# Patient Record
Sex: FEMALE | Race: WHITE | NOT HISPANIC OR LATINO | Employment: FULL TIME | URBAN - METROPOLITAN AREA
[De-identification: names, ages, dates, MRNs, and addresses within clinical notes are randomized per-mention and may not be internally consistent; named-entity substitution may affect disease eponyms.]

---

## 2017-01-01 ENCOUNTER — HOSPITAL ENCOUNTER (EMERGENCY)
Facility: MEDICAL CENTER | Age: 47
End: 2017-01-01
Attending: EMERGENCY MEDICINE
Payer: MEDICAID

## 2017-01-01 ENCOUNTER — APPOINTMENT (OUTPATIENT)
Dept: RADIOLOGY | Facility: MEDICAL CENTER | Age: 47
End: 2017-01-01
Attending: EMERGENCY MEDICINE
Payer: MEDICAID

## 2017-01-01 VITALS
DIASTOLIC BLOOD PRESSURE: 62 MMHG | SYSTOLIC BLOOD PRESSURE: 145 MMHG | OXYGEN SATURATION: 98 % | HEIGHT: 62 IN | BODY MASS INDEX: 29.44 KG/M2 | HEART RATE: 78 BPM | TEMPERATURE: 98 F | WEIGHT: 160 LBS | RESPIRATION RATE: 16 BRPM

## 2017-01-01 DIAGNOSIS — R10.2 PELVIC PAIN: ICD-10-CM

## 2017-01-01 LAB
ALBUMIN SERPL BCP-MCNC: 4.5 G/DL (ref 3.2–4.9)
ALBUMIN/GLOB SERPL: 1.6 G/DL
ALP SERPL-CCNC: 80 U/L (ref 30–99)
ALT SERPL-CCNC: 18 U/L (ref 2–50)
ANION GAP SERPL CALC-SCNC: 10 MMOL/L (ref 0–11.9)
AST SERPL-CCNC: 17 U/L (ref 12–45)
BASOPHILS # BLD AUTO: 0.6 % (ref 0–1.8)
BASOPHILS # BLD: 0.04 K/UL (ref 0–0.12)
BILIRUB SERPL-MCNC: 0.3 MG/DL (ref 0.1–1.5)
BUN SERPL-MCNC: 12 MG/DL (ref 8–22)
CALCIUM SERPL-MCNC: 9.7 MG/DL (ref 8.5–10.5)
CHLORIDE SERPL-SCNC: 103 MMOL/L (ref 96–112)
CO2 SERPL-SCNC: 25 MMOL/L (ref 20–33)
CREAT SERPL-MCNC: 0.63 MG/DL (ref 0.5–1.4)
EOSINOPHIL # BLD AUTO: 0.18 K/UL (ref 0–0.51)
EOSINOPHIL NFR BLD: 2.7 % (ref 0–6.9)
ERYTHROCYTE [DISTWIDTH] IN BLOOD BY AUTOMATED COUNT: 45.5 FL (ref 35.9–50)
GFR SERPL CREATININE-BSD FRML MDRD: >60 ML/MIN/1.73 M 2
GLOBULIN SER CALC-MCNC: 2.9 G/DL (ref 1.9–3.5)
GLUCOSE SERPL-MCNC: 89 MG/DL (ref 65–99)
HCT VFR BLD AUTO: 39 % (ref 37–47)
HGB BLD-MCNC: 11.9 G/DL (ref 12–16)
IMM GRANULOCYTES # BLD AUTO: 0.02 K/UL (ref 0–0.11)
IMM GRANULOCYTES NFR BLD AUTO: 0.3 % (ref 0–0.9)
INR PPP: 0.81 (ref 0.87–1.13)
LYMPHOCYTES # BLD AUTO: 1.96 K/UL (ref 1–4.8)
LYMPHOCYTES NFR BLD: 29.5 % (ref 22–41)
MCH RBC QN AUTO: 23.7 PG (ref 27–33)
MCHC RBC AUTO-ENTMCNC: 30.5 G/DL (ref 33.6–35)
MCV RBC AUTO: 77.7 FL (ref 81.4–97.8)
MONOCYTES # BLD AUTO: 0.49 K/UL (ref 0–0.85)
MONOCYTES NFR BLD AUTO: 7.4 % (ref 0–13.4)
NEUTROPHILS # BLD AUTO: 3.96 K/UL (ref 2–7.15)
NEUTROPHILS NFR BLD: 59.5 % (ref 44–72)
NRBC # BLD AUTO: 0 K/UL
NRBC BLD AUTO-RTO: 0 /100 WBC
PLATELET # BLD AUTO: 366 K/UL (ref 164–446)
PMV BLD AUTO: 8.9 FL (ref 9–12.9)
POTASSIUM SERPL-SCNC: 3.8 MMOL/L (ref 3.6–5.5)
PROT SERPL-MCNC: 7.4 G/DL (ref 6–8.2)
PROTHROMBIN TIME: 11.4 SEC (ref 12–14.6)
RBC # BLD AUTO: 5.02 M/UL (ref 4.2–5.4)
SODIUM SERPL-SCNC: 138 MMOL/L (ref 135–145)
WBC # BLD AUTO: 6.7 K/UL (ref 4.8–10.8)

## 2017-01-01 PROCEDURE — 96375 TX/PRO/DX INJ NEW DRUG ADDON: CPT

## 2017-01-01 PROCEDURE — 99285 EMERGENCY DEPT VISIT HI MDM: CPT

## 2017-01-01 PROCEDURE — 80053 COMPREHEN METABOLIC PANEL: CPT

## 2017-01-01 PROCEDURE — 96374 THER/PROPH/DIAG INJ IV PUSH: CPT

## 2017-01-01 PROCEDURE — 96376 TX/PRO/DX INJ SAME DRUG ADON: CPT

## 2017-01-01 PROCEDURE — 700105 HCHG RX REV CODE 258: Performed by: EMERGENCY MEDICINE

## 2017-01-01 PROCEDURE — 96361 HYDRATE IV INFUSION ADD-ON: CPT

## 2017-01-01 PROCEDURE — 700111 HCHG RX REV CODE 636 W/ 250 OVERRIDE (IP): Performed by: EMERGENCY MEDICINE

## 2017-01-01 PROCEDURE — 85025 COMPLETE CBC W/AUTO DIFF WBC: CPT

## 2017-01-01 PROCEDURE — 85610 PROTHROMBIN TIME: CPT

## 2017-01-01 PROCEDURE — 76830 TRANSVAGINAL US NON-OB: CPT

## 2017-01-01 RX ORDER — ONDANSETRON 2 MG/ML
4 INJECTION INTRAMUSCULAR; INTRAVENOUS ONCE
Status: COMPLETED | OUTPATIENT
Start: 2017-01-01 | End: 2017-01-01

## 2017-01-01 RX ORDER — OXYCODONE HYDROCHLORIDE AND ACETAMINOPHEN 5; 325 MG/1; MG/1
15 TABLET ORAL EVERY 4 HOURS PRN
Qty: 1 TAB | Refills: 0 | Status: ON HOLD | OUTPATIENT
Start: 2017-01-01 | End: 2017-01-03

## 2017-01-01 RX ORDER — ONDANSETRON 4 MG/1
4 TABLET, FILM COATED ORAL EVERY 4 HOURS PRN
Qty: 15 EACH | Refills: 0 | Status: ON HOLD | OUTPATIENT
Start: 2017-01-01 | End: 2017-01-03

## 2017-01-01 RX ORDER — CYCLOBENZAPRINE HCL 10 MG
10 TABLET ORAL 3 TIMES DAILY PRN
Qty: 10 TAB | Refills: 0 | Status: SHIPPED | OUTPATIENT
Start: 2017-01-01 | End: 2017-01-06

## 2017-01-01 RX ORDER — OXYCODONE HYDROCHLORIDE AND ACETAMINOPHEN 5; 325 MG/1; MG/1
1 TABLET ORAL EVERY 4 HOURS PRN
Qty: 15 TAB | Refills: 0 | Status: ON HOLD | OUTPATIENT
Start: 2017-01-01 | End: 2017-01-03

## 2017-01-01 RX ORDER — SODIUM CHLORIDE 9 MG/ML
INJECTION, SOLUTION INTRAVENOUS CONTINUOUS
Status: DISCONTINUED | OUTPATIENT
Start: 2017-01-01 | End: 2017-01-01 | Stop reason: HOSPADM

## 2017-01-01 RX ADMIN — HYDROMORPHONE HYDROCHLORIDE 1 MG: 1 INJECTION, SOLUTION INTRAMUSCULAR; INTRAVENOUS; SUBCUTANEOUS at 13:31

## 2017-01-01 RX ADMIN — SODIUM CHLORIDE: 9 INJECTION, SOLUTION INTRAVENOUS at 13:27

## 2017-01-01 RX ADMIN — HYDROMORPHONE HYDROCHLORIDE 1 MG: 1 INJECTION, SOLUTION INTRAMUSCULAR; INTRAVENOUS; SUBCUTANEOUS at 14:56

## 2017-01-01 RX ADMIN — ONDANSETRON 4 MG: 2 INJECTION, SOLUTION INTRAMUSCULAR; INTRAVENOUS at 14:56

## 2017-01-01 RX ADMIN — ONDANSETRON 4 MG: 2 INJECTION, SOLUTION INTRAMUSCULAR; INTRAVENOUS at 13:30

## 2017-01-01 ASSESSMENT — PAIN SCALES - GENERAL
PAINLEVEL_OUTOF10: 7
PAINLEVEL_OUTOF10: 7
PAINLEVEL_OUTOF10: 0
PAINLEVEL_OUTOF10: 7

## 2017-01-01 ASSESSMENT — LIFESTYLE VARIABLES
EVER FELT BAD OR GUILTY ABOUT YOUR DRINKING: NO
HAVE YOU EVER FELT YOU SHOULD CUT DOWN ON YOUR DRINKING: NO
CONSUMPTION TOTAL: NEGATIVE
TOTAL SCORE: 0
DO YOU DRINK ALCOHOL: NO
ON A TYPICAL DAY WHEN YOU DRINK ALCOHOL HOW MANY DRINKS DO YOU HAVE: 1
HAVE PEOPLE ANNOYED YOU BY CRITICIZING YOUR DRINKING: NO
AVERAGE NUMBER OF DAYS PER WEEK YOU HAVE A DRINK CONTAINING ALCOHOL: .5
TOTAL SCORE: 0
EVER HAD A DRINK FIRST THING IN THE MORNING TO STEADY YOUR NERVES TO GET RID OF A HANGOVER: NO
HOW MANY TIMES IN THE PAST YEAR HAVE YOU HAD 5 OR MORE DRINKS IN A DAY: 0
TOTAL SCORE: 0
DO YOU DRINK ALCOHOL: YES

## 2017-01-01 NOTE — ED NOTES
BREAK RN NOTE**  PIV placed to R ac, blood drawn and sent to the lab.  Pt medicated for pain/nausea per MAR.  NS infusing.  Call light in hand.  Pt on all monitoring equipment.

## 2017-01-01 NOTE — ED PROVIDER NOTES
ED Provider Note  CHIEF COMPLAINT  Chief Complaint   Patient presents with   • Vaginal Bleeding       HPI  Trevon Brennan is a 46 y.o. female who presents some mild vaginal bleeding and some pelvic pain. She's had the intermittent vaginal bleeding and pelvic pain for several months. She is scheduled for a hysterectomy this coming Tuesday by Dr. Sears. She's had recent lab work done. She had a pregnancy test which was -2 days ago. Came in because of the pain mainly.    REVIEW OF SYSTEMS  No headache, no chest pain, no abdominal pain. Just occasional abdominal cramps.  ALL OTHER SYSTEMS NEGATIVE    ALLERGIES  Allergies   Allergen Reactions   • Erythromycin Hives     Hives for a week       CURRENT MEDICATIONS  Home Medications     **Home medications have not yet been reviewed for this encounter**          PAST MEDICAL HISTORY  Past Medical History   Diagnosis Date   • migraines    • Urinary incontinence    • Heart burn    • Indigestion    • Bowel habit changes    • Depression 2016     anxiety and depression       SURGICAL HISTORY  Past Surgical History   Procedure Laterality Date   • Other abdominal surgery       choley   • Other abdominal surgery       appy   • Gastroscopy-endo  12/21/2011     Performed by DELFINO MICHELE at ENDOSCOPY [facility]   • Ercp in or  11/18/2012     Performed by Junior Davenport M.D. at SURGERY [facility]       FAMILY HISTORY  No family history on file.    SOCIAL HISTORY  Social History     Social History   • Marital Status: Single     Spouse Name: N/A   • Number of Children: N/A   • Years of Education: N/A     Social History Main Topics   • Smoking status: Never Smoker    • Smokeless tobacco: Never Used   • Alcohol Use: No   • Drug Use: No   • Sexual Activity: Not on file     Other Topics Concern   • Not on file     Social History Narrative       PHYSICAL EXAM  GENERAL: Alert healthy-appearing female moderately obese  VITAL SIGNS: /74 mmHg  Pulse 72  Temp(Src)  "36.2 °C (97.2 °F) (Temporal)  Resp 16  Ht 1.575 m (5' 2.01\")  Wt 72.576 kg (160 lb)  BMI 29.26 kg/m2  SpO2 97%  LMP  (Within Weeks)  Constitutional: Alert healthy-appearing adult   HENT: Scalp is normal size and nontender. Ears are clear. Nose is clear. Throat is clear with no stridor no drooling no trismus. Teeth are all intact.  Eyes: Pupils equal round and reactive to light, extraocular motor fall. There is no scleral icterus.  Neck: Neck is supple and nontender. There is no meningismus. No adenitis. No thyromegaly.  Lymphatic: No adenopathy.   Cardiovascular: Heart regular rhythm without murmurs or gallops   Thorax & Lungs: No chest wall tenderness. Lungs are clear. Patient has good breath sounds bilateral. No rales, no rhonchi, no wheezes.  Abdomen: Abdomen is soft, nontender, not rigid, no guarding, and no organomegaly. There is no palpable hernia   Skin: Warm, pink, and dry with no erythema and no rash.   Back: Nontender, no midline bony tenderness, no flank tenderness.  Examination: The uterus is nontender. Slightly enlarged. Cervical os is closed. Some blood in the vagina. No tenderness. No salpingitis.  Extremities: Full range of motion  No tenderness to palpation and no deformities noted. No calf or thigh swelling. No calf or thigh tenderness. No clinical DVT.  Neurologic: Alert & oriented . Cranial nerves are grossly intact as tested. Patient moves all 4 extremities well. Patient has good strong flexion and extension of the ankle joints knee joints hip joints and elbow joints. Sensation is normal and symmetrical in the upper and lower extremities.   Psychiatric: Patient is alert oriented coherent and rational.       RADIOLOGY/PROCEDURES  US-GYN-PELVIS TRANSVAGINAL   Final Result      1.  There are 2 uterine body myometrial fibroids.            COURSE & MEDICAL DECISION MAKING  The patient is scheduled for a hysterectomy for vaginal bleeding and pelvic pain in 2 days. She had a negative pregnancy " test 2 or 3 days ago. Her hemoglobin and hematocrit of and normal. She comes in because of pain. Her home pain medication is Ultram. She is also on Effexor and Seroquel and Klonopin.    Plan: #1 IV #2 intravenous Zofran and Dilaudid No. 3. Pelvic ultrasound #4. Lab including CBC, CMP, pro time. #5. Review of previous medical records    Review of previous medical records: She is scheduled for a hysterectomy on Tuesday of this week by Dr. roula Sears. Her medications at home include oxycodone, Protonix, Klonopin, Effexor, Seroquel, Norco, Lioresal.    Laboratory and reexamination: Ultrasound shows uterine fibroids. M history panel was normal. INR is normal at 0.81. Hemoglobin is 11.9. No anemia. No bandemia.  Results for orders placed or performed during the hospital encounter of 01/01/17   CBC WITH DIFFERENTIAL   Result Value Ref Range    WBC 6.7 4.8 - 10.8 K/uL    RBC 5.02 4.20 - 5.40 M/uL    Hemoglobin 11.9 (L) 12.0 - 16.0 g/dL    Hematocrit 39.0 37.0 - 47.0 %    MCV 77.7 (L) 81.4 - 97.8 fL    MCH 23.7 (L) 27.0 - 33.0 pg    MCHC 30.5 (L) 33.6 - 35.0 g/dL    RDW 45.5 35.9 - 50.0 fL    Platelet Count 366 164 - 446 K/uL    MPV 8.9 (L) 9.0 - 12.9 fL    Neutrophils-Polys 59.50 44.00 - 72.00 %    Lymphocytes 29.50 22.00 - 41.00 %    Monocytes 7.40 0.00 - 13.40 %    Eosinophils 2.70 0.00 - 6.90 %    Basophils 0.60 0.00 - 1.80 %    Immature Granulocytes 0.30 0.00 - 0.90 %    Nucleated RBC 0.00 /100 WBC    Neutrophils (Absolute) 3.96 2.00 - 7.15 K/uL    Lymphs (Absolute) 1.96 1.00 - 4.80 K/uL    Monos (Absolute) 0.49 0.00 - 0.85 K/uL    Eos (Absolute) 0.18 0.00 - 0.51 K/uL    Baso (Absolute) 0.04 0.00 - 0.12 K/uL    Immature Granulocytes (abs) 0.02 0.00 - 0.11 K/uL    NRBC (Absolute) 0.00 K/uL   COMP METABOLIC PANEL   Result Value Ref Range    Sodium 138 135 - 145 mmol/L    Potassium 3.8 3.6 - 5.5 mmol/L    Chloride 103 96 - 112 mmol/L    Co2 25 20 - 33 mmol/L    Anion Gap 10.0 0.0 - 11.9    Glucose 89 65 - 99 mg/dL    Bun  12 8 - 22 mg/dL    Creatinine 0.63 0.50 - 1.40 mg/dL    Calcium 9.7 8.5 - 10.5 mg/dL    AST(SGOT) 17 12 - 45 U/L    ALT(SGPT) 18 2 - 50 U/L    Alkaline Phosphatase 80 30 - 99 U/L    Total Bilirubin 0.3 0.1 - 1.5 mg/dL    Albumin 4.5 3.2 - 4.9 g/dL    Total Protein 7.4 6.0 - 8.2 g/dL    Globulin 2.9 1.9 - 3.5 g/dL    A-G Ratio 1.6 g/dL   PROTHROMBIN TIME (INR)   Result Value Ref Range    PT 11.4 (L) 12.0 - 14.6 sec    INR 0.81 (L) 0.87 - 1.13   ESTIMATED GFR   Result Value Ref Range    GFR If African American >60 >60 mL/min/1.73 m 2    GFR If Non African American >60 >60 mL/min/1.73 m 2        The patient stable for discharge at 3:45 PM.    Home treatment: #1 Percocet and Flexeril No. 2 follow-up with her gynecologist tomorrow. #3 she'll have her hysterectomy on Tuesday. Return as needed.  FINAL IMPRESSION  1. Pelvic pain and vaginal bleeding acute and chronic  2.  Uterine fibroids and dysfunctional uterine bleeding         Electronically signed by: Gary Gansert, 1/1/2017 4 PM

## 2017-01-01 NOTE — ED NOTES
Pt reports vaginal bleeding and pelvic pain. Pt has hysterectomy schedulded on Tuesday. Pt bib EMS for worsening symptoms. Pt in NAD. VSS

## 2017-01-01 NOTE — ED AVS SNAPSHOT
SirionLabs Access Code: [email or web address]  Expires: 1/31/2017  4:18 PM    Your email address is not on file at [facility].  Email Addresses are required for you to sign up for SirionLabs, please contact [phone] to verify your personal information and to provide your email address prior to attempting to register for SirionLabs.    Trevon Brennan  [address] [place] [address]    SirionLabs  A secure, online tool to manage your health information     [employer]’s SirionLabs® is a secure, online tool that connects you to your personalized health information from the privacy of your home -- day or night - making it very easy for you to manage your healthcare. Once the activation process is completed, you can even access your medical information using the SirionLabs kirill, which is available for free in the Apple Kirill store or Google Play store.     To learn more about SirionLabs, visit [email or web address]    There are two levels of access available (as shown below):   My Chart Features  [facility] Primary Care Doctor [facility]  Specialists [facility]  Urgent  Care Non-[facility] Primary Care Doctor   Email your healthcare team securely and privately 24/7 X X X    Manage appointments: schedule your next appointment; view details of past/upcoming appointments X      Request prescription refills. X      View recent personal medical records, including lab and immunizations X X X X   View health record, including health history, allergies, medications X X X X   Read reports about your outpatient visits, procedures, consult and ER notes X X X X   See your discharge summary, which is a recap of your hospital and/or ER visit that includes your diagnosis, lab results, and care plan X X  X     How to register for ClearAppt:  Once your e-mail address has been verified, follow the following steps to sign up for ClearAppt.     1. Go to  [email or web address]  2. Click on the Sign Up Now box, which takes you to the New Member Sign Up page. You  will need to provide the following information:  a. Enter your VTEX Access Code exactly as it appears at the top of this page. (You will not need to use this code after you’ve completed the sign-up process. If you do not sign up before the expiration date, you must request a new code.)   b. Enter your date of birth.   c. Enter your home email address.   d. Click Submit, and follow the next screen’s instructions.  3. Create a Innovational Fundingt ID. This will be your VTEX login ID and cannot be changed, so think of one that is secure and easy to remember.  4. Create a VTEX password. You can change your password at any time.  5. Enter your Password Reset Question and Answer. This can be used at a later time if you forget your password.   6. Enter your e-mail address. This allows you to receive e-mail notifications when new information is available in VTEX.  7. Click Sign Up. You can now view your health information.    For assistance activating your VTEX account, call [phone]

## 2017-01-01 NOTE — ED AVS SNAPSHOT
After Visit Summary                                                                                                                Trevon Brennan   MRN: [id number]    Department:  [facility], Emergency Dept   Date of Visit:  1/1/2017            [facility], Emergency Dept    [address]    [place] [address]    Phone:  [phone]      You were seen by     Gary Gansert, M.D.      Your Diagnosis Was     Pelvic pain     R10.2       These are the medications you received during your hospitalization from 01/01/2017 1235 to 01/01/2017 1618     Date/Time Order Dose Route Action    01/01/2017 1327 NS infusion   Intravenous New Bag    01/01/2017 1331 HYDROmorphone (DILAUDID) injection 1 mg 1 mg Intravenous Given    01/01/2017 1330 ondansetron (ZOFRAN) syringe/vial injection 4 mg 4 mg Intravenous Given    01/01/2017 1456 HYDROmorphone (DILAUDID) injection 1 mg 1 mg Intravenous Given    01/01/2017 1456 ondansetron (ZOFRAN) syringe/vial injection 4 mg 4 mg Intravenous Given      Medication Information     Review all of your home medications and newly ordered medications with your primary doctor and/or pharmacist as soon as possible. Follow medication instructions as directed by your doctor and/or pharmacist.     Please keep your complete medication list with you and share with your physician. Update the information when medications are discontinued, doses are changed, or new medications (including over-the-counter products) are added; and carry medication information at all times in the event of emergency situations.               Medication List      START taking these medications        Instructions    cyclobenzaprine 10 MG Tabs   Commonly known as:  FLEXERIL    Take 1 Tab by mouth 3 times a day as needed.   Dose:  10 mg       ondansetron 4 MG Tabs tablet   Commonly known as:  ZOFRAN    Take 1 Tab by mouth every four hours as needed for Nausea/Vomiting.   Dose:  4 mg       oxycodone-acetaminophen 5-325 MG Tabs   Commonly known as:  PERCOCET    Take 15 Tabs by mouth every four hours as needed.   Dose:  15 Tab         ASK your doctor about these medications        Instructions    baclofen 10 MG Tabs   Commonly known as:  LIORESAL    Take 10 mg by mouth every day.   Dose:  10 mg       clonazepam 0.5 MG Tabs   Commonly known as:  KLONOPIN    Take 0.25 mg by mouth every day. Pt took approx 8 at 1130 and then 20 pills at 1200.   Dose:  0.25 mg       EFFEXOR 75 MG Tabs   Generic drug:  venlafaxine    Take 75 mg by mouth 3 times a day.   Dose:  75 mg       famotidine 20 MG Tabs   Commonly known as:  PEPCID    Take 20 mg by mouth as needed.   Dose:  20 mg       ferrous gluconate 324 (38 FE) MG Tabs   Commonly known as:  FERGON    Take 324 mg by mouth 2 times a day, with meals.   Dose:  324 mg       hydrocodone-acetaminophen 5-325 MG Tabs per tablet   Commonly known as:  NORCO    Take 1-2 Tabs by mouth every four hours as needed.   Dose:  1-2 Tab       ondansetron 4 MG Tbdp   Commonly known as:  ZOFRAN ODT    Take 1 Tab by mouth 4 times a day as needed for Nausea/Vomiting.   Dose:  4 mg       oxycodone immediate release 10 MG immediate release tablet   Commonly known as:  ROXICODONE    Take 1 Tab by mouth every four hours as needed ((4-6)).   Dose:  10 mg       pantoprazole 40 MG Tbec   Commonly known as:  PROTONIX    Take 40 mg by mouth every day.   Dose:  40 mg       PAXIL 40 MG tablet   Generic drug:  paroxetine    Take 40 mg by mouth every day.   Dose:  40 mg       SEROQUEL 100 MG Tabs   Generic drug:  quetiapine    Take 100 mg by mouth 2 times a day.   Dose:  100 mg       tramadol 50 MG Tabs   Commonly known as:  ULTRAM    Take 50 mg by mouth every 6 hours as needed.   Dose:  50 mg       TYLENOL COLD PO    Take  by mouth as needed.               Procedures and tests performed during your visit     CBC WITH DIFFERENTIAL    COMP METABOLIC PANEL    ESTIMATED GFR    IV Saline Lock     PROTHROMBIN TIME (INR)    US-GYN-PELVIS TRANSVAGINAL            Patient Information     Patient Information    Following emergency treatment: all patient requiring follow-up care must return either to a private physician or a clinic if your condition worsens before you are able to obtain further medical attention, please return to the emergency room.     Billing Information    At [facility], we work to make the billing process streamlined for our patients.  Our Representatives are here to answer any questions you may have regarding your hospital bill.  If you have insurance coverage and have supplied your insurance information to us, we will submit a claim to your insurer on your behalf.  Should you have any questions regarding your bill, we can be reached online or by phone as follows:  Online: You are able pay your bills online or live chat with our representatives about any billing questions you may have. We are here to help Monday - Friday from 8:00am to 7:30pm and 9:00am - 12:00pm on Saturdays.  Please visit [email or web address]  for more information.   Phone:  [phone] or [phone]    Please note that your emergency physician, surgeon, pathologist, radiologist, anesthesiologist, and other specialists are not employed by [facility] and will therefore bill separately for their services.  Please contact them directly for any questions concerning their bills at the numbers below:     Emergency Physician Services:  [phone]  [facility]:  [phone]  [facility]:  [phone]  [facility]:  [phone]    1. Your final bill may vary from the amount quoted upon discharge if all procedures are not complete at that time, or if your doctor has additional procedures of which we are not aware. You will receive an additional bill if you return to the Emergency Department at [facility] for suture removal regardless of the  facility of which the sutures were placed.     2. Please arrange for settlement of this account at the emergency registration.    3. All self-pay accounts are due in full at the time of treatment.  If you are unable to meet this obligation then payment is expected within 4-5 days.     4. If you have had radiology studies (CT, X-ray, Ultrasound, MRI), you have received a preliminary result during your emergency department visit. Please contact the radiology department [phone] to receive a copy of your final result. Please discuss the Final result with your primary physician or with the follow up physician provided.     Crisis Hotline:  [place] Crisis Hotline:  3-623-PBWKCUY or 1-320.166.5442  [place] Crisis Hotline:    1-572.455.4451 or 429-596-2770         ED Discharge Follow Up Questions    1. In order to provide you with very good care, we would like to follow up with a phone call in the next few days.  May we have your permission to contact you?     YES /  NO    2. What is the best phone number to call you? (       )_____-__________    3. What is the best time to call you?      Morning  /  Afternoon  /  Evening                   Patient Signature:  ____________________________________________________________    Date:  ____________________________________________________________

## 2017-01-01 NOTE — ED AVS SNAPSHOT
1/1/2017          Trevon Brennan  [address] [place] [address]    Dear Trevon:    [facility] wants to ensure your discharge home is safe and you or your loved ones have had all your questions answered regarding your care after you leave the hospital.    You may receive a telephone call within two days of your discharge.  This call is to make certain you understand your discharge instructions as well as ensure we provided you with the best care possible during your stay with us.     The call will only last approximately 3-5 minutes and will be done by a nurse.    Once again, we want to ensure your discharge home is safe and that you have a clear understanding of any next steps in your care.  If you have any questions or concerns, please do not hesitate to contact us, we are here for you.  Thank you for choosing [facility] for your healthcare needs.    Sincerely,    Phil Orozco    [facility]

## 2017-01-02 NOTE — ED NOTES
Pt given discharge instructions and prescriptions for percocet, zofran and flexeril, understanding verbalized. Pt VSS resp even and unlabored. Pt ambulatory out of ER, NAD noted. Pt states she will receive ride home from friend.

## 2017-01-03 PROBLEM — N94.6 DYSMENORRHEA: Status: ACTIVE | Noted: 2017-01-03

## 2017-01-03 PROBLEM — D25.0 SUBMUCOUS LEIOMYOMA OF UTERUS: Status: ACTIVE | Noted: 2017-01-03

## 2017-01-03 PROBLEM — N92.6 IRREGULAR MENSTRUAL BLEEDING: Status: ACTIVE | Noted: 2017-01-03

## 2017-01-03 PROBLEM — R10.2 VAGINAL PAIN: Status: ACTIVE | Noted: 2017-01-03

## 2017-01-03 PROBLEM — N39.3 FEMALE STRESS INCONTINENCE: Status: ACTIVE | Noted: 2017-01-03

## 2017-01-06 ENCOUNTER — HOSPITAL ENCOUNTER (EMERGENCY)
Facility: MEDICAL CENTER | Age: 47
End: 2017-01-06
Payer: MEDICAID

## 2017-01-06 ENCOUNTER — HOSPITAL ENCOUNTER (EMERGENCY)
Facility: MEDICAL CENTER | Age: 47
End: 2017-01-07
Attending: EMERGENCY MEDICINE
Payer: MEDICAID

## 2017-01-06 ENCOUNTER — APPOINTMENT (OUTPATIENT)
Dept: RADIOLOGY | Facility: MEDICAL CENTER | Age: 47
End: 2017-01-06
Attending: EMERGENCY MEDICINE
Payer: MEDICAID

## 2017-01-06 VITALS
TEMPERATURE: 97.8 F | WEIGHT: 161.82 LBS | RESPIRATION RATE: 18 BRPM | DIASTOLIC BLOOD PRESSURE: 64 MMHG | SYSTOLIC BLOOD PRESSURE: 130 MMHG | HEART RATE: 71 BPM | OXYGEN SATURATION: 93 % | BODY MASS INDEX: 29.59 KG/M2

## 2017-01-06 VITALS
HEART RATE: 71 BPM | OXYGEN SATURATION: 93 % | DIASTOLIC BLOOD PRESSURE: 64 MMHG | RESPIRATION RATE: 18 BRPM | BODY MASS INDEX: 29.59 KG/M2 | TEMPERATURE: 97.8 F | WEIGHT: 161.82 LBS | SYSTOLIC BLOOD PRESSURE: 130 MMHG

## 2017-01-06 DIAGNOSIS — R10.30 LOWER ABDOMINAL PAIN: ICD-10-CM

## 2017-01-06 PROCEDURE — 700105 HCHG RX REV CODE 258: Performed by: EMERGENCY MEDICINE

## 2017-01-06 PROCEDURE — 99284 EMERGENCY DEPT VISIT MOD MDM: CPT

## 2017-01-06 PROCEDURE — 302449 STATCHG TRIAGE ONLY (STATISTIC)

## 2017-01-06 PROCEDURE — 83690 ASSAY OF LIPASE: CPT

## 2017-01-06 PROCEDURE — 96361 HYDRATE IV INFUSION ADD-ON: CPT

## 2017-01-06 PROCEDURE — 700111 HCHG RX REV CODE 636 W/ 250 OVERRIDE (IP): Performed by: EMERGENCY MEDICINE

## 2017-01-06 PROCEDURE — 80053 COMPREHEN METABOLIC PANEL: CPT

## 2017-01-06 PROCEDURE — 96374 THER/PROPH/DIAG INJ IV PUSH: CPT

## 2017-01-06 PROCEDURE — 85025 COMPLETE CBC W/AUTO DIFF WBC: CPT

## 2017-01-06 PROCEDURE — 96375 TX/PRO/DX INJ NEW DRUG ADDON: CPT

## 2017-01-06 RX ORDER — ONDANSETRON 2 MG/ML
4 INJECTION INTRAMUSCULAR; INTRAVENOUS ONCE
Status: COMPLETED | OUTPATIENT
Start: 2017-01-06 | End: 2017-01-06

## 2017-01-06 RX ORDER — SODIUM CHLORIDE 9 MG/ML
INJECTION, SOLUTION INTRAVENOUS CONTINUOUS
Status: DISCONTINUED | OUTPATIENT
Start: 2017-01-06 | End: 2017-01-07 | Stop reason: HOSPADM

## 2017-01-06 RX ADMIN — SODIUM CHLORIDE: 9 INJECTION, SOLUTION INTRAVENOUS at 23:37

## 2017-01-06 RX ADMIN — FENTANYL CITRATE 100 MCG: 50 INJECTION, SOLUTION INTRAMUSCULAR; INTRAVENOUS at 23:36

## 2017-01-06 RX ADMIN — ONDANSETRON 4 MG: 2 INJECTION, SOLUTION INTRAMUSCULAR; INTRAVENOUS at 23:36

## 2017-01-06 NOTE — ED AVS SNAPSHOT
Moneysoft Access Code: [email or web address]  Expires: 1/31/2017  4:18 PM    Your email address is not on file at [facility].  Email Addresses are required for you to sign up for Moneysoft, please contact [phone] to verify your personal information and to provide your email address prior to attempting to register for Moneysoft.    Trevon Brennan  [address] [place] [address]    Moneysoft  A secure, online tool to manage your health information     [facility]’s Moneysoft® is a secure, online tool that connects you to your personalized health information from the privacy of your home -- day or night - making it very easy for you to manage your healthcare. Once the activation process is completed, you can even access your medical information using the Moneysoft kirill, which is available for free in the Apple Kirill store or Google Play store.     To learn more about Moneysoft, visit [email or web address]    There are two levels of access available (as shown below):   My Chart Features  [facility] Primary Care Doctor [facility]  Specialists [facility]  Urgent  Care Non-[facility] Primary Care Doctor   Email your healthcare team securely and privately 24/7 X X X    Manage appointments: schedule your next appointment; view details of past/upcoming appointments X      Request prescription refills. X      View recent personal medical records, including lab and immunizations X X X X   View health record, including health history, allergies, medications X X X X   Read reports about your outpatient visits, procedures, consult and ER notes X X X X   See your discharge summary, which is a recap of your hospital and/or ER visit that includes your diagnosis, lab results, and care plan X X  X     How to register for DoseMet:  Once your e-mail address has been verified, follow the following steps to sign up for DoseMet.     1. Go to  [email or web address]  2. Click on the Sign Up Now box, which takes you to the New Member Sign Up page. You  will need to provide the following information:  a. Enter your WealthForge Access Code exactly as it appears at the top of this page. (You will not need to use this code after you’ve completed the sign-up process. If you do not sign up before the expiration date, you must request a new code.)   b. Enter your date of birth.   c. Enter your home email address.   d. Click Submit, and follow the next screen’s instructions.  3. Create a [employer]t ID. This will be your WealthForge login ID and cannot be changed, so think of one that is secure and easy to remember.  4. Create a WealthForge password. You can change your password at any time.  5. Enter your Password Reset Question and Answer. This can be used at a later time if you forget your password.   6. Enter your e-mail address. This allows you to receive e-mail notifications when new information is available in WealthForge.  7. Click Sign Up. You can now view your health information.    For assistance activating your WealthForge account, call [phone]

## 2017-01-06 NOTE — ED AVS SNAPSHOT
After Visit Summary                                                                                                                Trevon Brennan   MRN: [id number]    Department:  [facility], Emergency Dept   Date of Visit:  1/6/2017            [facility], Emergency Dept    [address] [place] [id number]    Phone:  [phone]      You were seen by     Viktor Wise M.D.      Your Diagnosis Was     Lower abdominal pain     R10.30       These are the medications you received during your hospitalization from 01/06/2017 2204 to 01/07/2017 0208     Date/Time Order Dose Route Action    01/06/2017 2337 NS infusion   Intravenous New Bag    01/06/2017 2336 fentaNYL (SUBLIMAZE) injection 100 mcg 100 mcg Intravenous Given    01/06/2017 2336 ondansetron (ZOFRAN) syringe/vial injection 4 mg 4 mg Intravenous Given    01/07/2017 0102 fentaNYL (SUBLIMAZE) injection 100 mcg 100 mcg Intravenous Given    01/07/2017 0034 iohexol (OMNIPAQUE) 350 mg/mL 100 mL Intravenous Given      Follow-up Information     1. Follow up with [facility], Emergency Dept.    Specialty:  Emergency Medicine    Why:  If symptoms worsen    Contact information    [address] [place] [id number] [phone]      Medication Information     Review all of your home medications and newly ordered medications with your primary doctor and/or pharmacist as soon as possible. Follow medication instructions as directed by your doctor and/or pharmacist.     Please keep your complete medication list with you and share with your physician. Update the information when medications are discontinued, doses are changed, or new medications (including over-the-counter products) are added; and carry medication information at all times in the event of emergency situations.               Medication List      ASK your doctor about these medications        Instructions    baclofen 10 MG Tabs   Commonly  known as:  LIORESAL    Take 10 mg by mouth every day.   Dose:  10 mg       famotidine 20 MG Tabs   Commonly known as:  PEPCID    Take 20 mg by mouth as needed.   Dose:  20 mg       ferrous gluconate 324 (38 FE) MG Tabs   Commonly known as:  FERGON    Take 324 mg by mouth 2 times a day, with meals.   Dose:  324 mg       hydrocodone-acetaminophen 5-325 MG Tabs per tablet   Commonly known as:  NORCO    Take 1-2 Tabs by mouth every four hours as needed.   Dose:  1-2 Tab       ondansetron 4 MG Tbdp   Commonly known as:  ZOFRAN ODT    Take 1 Tab by mouth 4 times a day as needed for Nausea/Vomiting.   Dose:  4 mg       oxycodone immediate release 10 MG immediate release tablet   Commonly known as:  ROXICODONE    Take 1 Tab by mouth every four hours as needed ((4-6)).   Dose:  10 mg       PAXIL 40 MG tablet   Generic drug:  paroxetine    Take 40 mg by mouth every day.   Dose:  40 mg       tramadol 50 MG Tabs   Commonly known as:  ULTRAM    Take 50 mg by mouth every 6 hours as needed.   Dose:  50 mg               Procedures and tests performed during your visit     CBC WITH DIFFERENTIAL    COMP METABOLIC PANEL    CT-ABDOMEN-PELVIS WITH    ESTIMATED GFR    IV Saline Lock    LIPASE        Discharge Instructions       Abdominal Pain, Women  Abdominal (stomach, pelvic, or belly) pain can be caused by many things. It is important to tell your doctor:  · The location of the pain.  · Does it come and go or is it present all the time?  · Are there things that start the pain (eating certain foods, exercise)?  · Are there other symptoms associated with the pain (fever, nausea, vomiting, diarrhea)?  All of this is helpful to know when trying to find the cause of the pain.  CAUSES   · Stomach: virus or bacteria infection, or ulcer.  · Intestine: appendicitis (inflamed appendix), regional ileitis (Crohn's disease), ulcerative colitis (inflamed colon), irritable bowel syndrome, diverticulitis (inflamed diverticulum of the colon), or  cancer of the stomach or intestine.  · Gallbladder disease or stones in the gallbladder.  · Kidney disease, kidney stones, or infection.  · Pancreas infection or cancer.  · Fibromyalgia (pain disorder).  · Diseases of the female organs:  · Uterus: fibroid (non-cancerous) tumors or infection.  · Fallopian tubes: infection or tubal pregnancy.  · Ovary: cysts or tumors.  · Pelvic adhesions (scar tissue).  · Endometriosis (uterus lining tissue growing in the pelvis and on the pelvic organs).  · Pelvic congestion syndrome (female organs filling up with blood just before the menstrual period).  · Pain with the menstrual period.  · Pain with ovulation (producing an egg).  · Pain with an IUD (intrauterine device, birth control) in the uterus.  · Cancer of the female organs.  · Functional pain (pain not caused by a disease, may improve without treatment).  · Psychological pain.  · Depression.  DIAGNOSIS   Your doctor will decide the seriousness of your pain by doing an examination.  · Blood tests.  · X-rays.  · Ultrasound.  · CT scan (computed tomography, special type of X-ray).  · MRI (magnetic resonance imaging).  · Cultures, for infection.  · Barium enema (dye inserted in the large intestine, to better view it with X-rays).  · Colonoscopy (looking in intestine with a lighted tube).  · Laparoscopy (minor surgery, looking in abdomen with a lighted tube).  · Major abdominal exploratory surgery (looking in abdomen with a large incision).  TREATMENT   The treatment will depend on the cause of the pain.   · Many cases can be observed and treated at home.  · Over-the-counter medicines recommended by your caregiver.  · Prescription medicine.  · Antibiotics, for infection.  · Birth control pills, for painful periods or for ovulation pain.  · Hormone treatment, for endometriosis.  · Nerve blocking injections.  · Physical therapy.  · Antidepressants.  · Counseling with a psychologist or psychiatrist.  · Minor or major surgery.  HOME  CARE INSTRUCTIONS   · Do not take laxatives, unless directed by your caregiver.  · Take over-the-counter pain medicine only if ordered by your caregiver. Do not take aspirin because it can cause an upset stomach or bleeding.  · Try a clear liquid diet (broth or water) as ordered by your caregiver. Slowly move to a bland diet, as tolerated, if the pain is related to the stomach or intestine.  · Have a thermometer and take your temperature several times a day, and record it.  · Bed rest and sleep, if it helps the pain.  · Avoid sexual intercourse, if it causes pain.  · Avoid stressful situations.  · Keep your follow-up appointments and tests, as your caregiver orders.  · If the pain does not go away with medicine or surgery, you may try:  · Acupuncture.  · Relaxation exercises (yoga, meditation).  · Group therapy.  · Counseling.  SEEK MEDICAL CARE IF:   · You notice certain foods cause stomach pain.  · Your home care treatment is not helping your pain.  · You need stronger pain medicine.  · You want your IUD removed.  · You feel faint or lightheaded.  · You develop nausea and vomiting.  · You develop a rash.  · You are having side effects or an allergy to your medicine.  SEEK IMMEDIATE MEDICAL CARE IF:   · Your pain does not go away or gets worse.  · You have a fever.  · Your pain is felt only in portions of the abdomen. The right side could possibly be appendicitis. The left lower portion of the abdomen could be colitis or diverticulitis.  · You are passing blood in your stools (bright red or black tarry stools, with or without vomiting).  · You have blood in your urine.  · You develop chills, with or without a fever.  · You pass out.  MAKE SURE YOU:   · Understand these instructions.  · Will watch your condition.  · Will get help right away if you are not doing well or get worse.  Document Released: 10/14/2008 Document Revised: 03/11/2013 Document Reviewed: 11/04/2010  [employer]® Patient Information ©2014 [employer].            Patient Information     Patient Information    Following emergency treatment: all patient requiring follow-up care must return either to a private physician or a clinic if your condition worsens before you are able to obtain further medical attention, please return to the emergency room.     Billing Information    At [facility], we work to make the billing process streamlined for our patients.  Our Representatives are here to answer any questions you may have regarding your hospital bill.  If you have insurance coverage and have supplied your insurance information to us, we will submit a claim to your insurer on your behalf.  Should you have any questions regarding your bill, we can be reached online or by phone as follows:  Online: You are able pay your bills online or live chat with our representatives about any billing questions you may have. We are here to help Monday - Friday from 8:00am to 7:30pm and 9:00am - 12:00pm on Saturdays.  Please visit [email or web address]  for more information.   Phone:  [phone] or [phone]    Please note that your emergency physician, surgeon, pathologist, radiologist, anesthesiologist, and other specialists are not employed by [facility] and will therefore bill separately for their services.  Please contact them directly for any questions concerning their bills at the numbers below:     Emergency Physician Services:  [phone]  [facility]:  [phone]  [facility]:  [phone]  [facility]:  [phone]    1. Your final bill may vary from the amount quoted upon discharge if all procedures are not complete at that time, or if your doctor has additional procedures of which we are not aware. You will receive an additional bill if you return to the Emergency Department at [facility] for suture removal regardless of the facility of which the sutures were placed.      2. Please arrange for settlement of this account at the emergency registration.    3. All self-pay accounts are due in full at the time of treatment.  If you are unable to meet this obligation then payment is expected within 4-5 days.     4. If you have had radiology studies (CT, X-ray, Ultrasound, MRI), you have received a preliminary result during your emergency department visit. Please contact the radiology department [phone] to receive a copy of your final result. Please discuss the Final result with your primary physician or with the follow up physician provided.     Crisis Hotline:  [place] Crisis Hotline:  1-573-LNAHFRJ or 1-678.449.1407  [place] Crisis Hotline:    1-778.877.4593 or 777-561-9026         ED Discharge Follow Up Questions    1. In order to provide you with very good care, we would like to follow up with a phone call in the next few days.  May we have your permission to contact you?     YES /  NO    2. What is the best phone number to call you? (       )_____-__________    3. What is the best time to call you?      Morning  /  Afternoon  /  Evening                   Patient Signature:  ____________________________________________________________    Date:  ____________________________________________________________

## 2017-01-06 NOTE — ED AVS SNAPSHOT
1/7/2017          Trevon Brennan  [address] [place] [address]    Dear Trevon:    [facility] wants to ensure your discharge home is safe and you or your loved ones have had all your questions answered regarding your care after you leave the hospital.    You may receive a telephone call within two days of your discharge.  This call is to make certain you understand your discharge instructions as well as ensure we provided you with the best care possible during your stay with us.     The call will only last approximately 3-5 minutes and will be done by a nurse.    Once again, we want to ensure your discharge home is safe and that you have a clear understanding of any next steps in your care.  If you have any questions or concerns, please do not hesitate to contact us, we are here for you.  Thank you for choosing [facility] for your healthcare needs.    Sincerely,    Phil Orozco    [facility]

## 2017-01-07 VITALS
HEART RATE: 74 BPM | SYSTOLIC BLOOD PRESSURE: 131 MMHG | OXYGEN SATURATION: 100 % | TEMPERATURE: 98.2 F | RESPIRATION RATE: 18 BRPM | DIASTOLIC BLOOD PRESSURE: 71 MMHG

## 2017-01-07 LAB
ALBUMIN SERPL BCP-MCNC: 3.8 G/DL (ref 3.2–4.9)
ALBUMIN/GLOB SERPL: 1.5 G/DL
ALP SERPL-CCNC: 74 U/L (ref 30–99)
ALT SERPL-CCNC: 24 U/L (ref 2–50)
ANION GAP SERPL CALC-SCNC: 7 MMOL/L (ref 0–11.9)
AST SERPL-CCNC: 28 U/L (ref 12–45)
BASOPHILS # BLD AUTO: 0.3 % (ref 0–1.8)
BASOPHILS # BLD: 0.03 K/UL (ref 0–0.12)
BILIRUB SERPL-MCNC: 0.3 MG/DL (ref 0.1–1.5)
BUN SERPL-MCNC: 10 MG/DL (ref 8–22)
CALCIUM SERPL-MCNC: 9.3 MG/DL (ref 8.5–10.5)
CHLORIDE SERPL-SCNC: 99 MMOL/L (ref 96–112)
CO2 SERPL-SCNC: 27 MMOL/L (ref 20–33)
CREAT SERPL-MCNC: 0.59 MG/DL (ref 0.5–1.4)
EOSINOPHIL # BLD AUTO: 0.31 K/UL (ref 0–0.51)
EOSINOPHIL NFR BLD: 3.6 % (ref 0–6.9)
ERYTHROCYTE [DISTWIDTH] IN BLOOD BY AUTOMATED COUNT: 44.7 FL (ref 35.9–50)
GFR SERPL CREATININE-BSD FRML MDRD: >60 ML/MIN/1.73 M 2
GLOBULIN SER CALC-MCNC: 2.5 G/DL (ref 1.9–3.5)
GLUCOSE SERPL-MCNC: 98 MG/DL (ref 65–99)
HCT VFR BLD AUTO: 32.1 % (ref 37–47)
HGB BLD-MCNC: 10 G/DL (ref 12–16)
IMM GRANULOCYTES # BLD AUTO: 0.05 K/UL (ref 0–0.11)
IMM GRANULOCYTES NFR BLD AUTO: 0.6 % (ref 0–0.9)
LIPASE SERPL-CCNC: 14 U/L (ref 11–82)
LYMPHOCYTES # BLD AUTO: 2.05 K/UL (ref 1–4.8)
LYMPHOCYTES NFR BLD: 23.6 % (ref 22–41)
MCH RBC QN AUTO: 24 PG (ref 27–33)
MCHC RBC AUTO-ENTMCNC: 31.2 G/DL (ref 33.6–35)
MCV RBC AUTO: 77.2 FL (ref 81.4–97.8)
MONOCYTES # BLD AUTO: 0.61 K/UL (ref 0–0.85)
MONOCYTES NFR BLD AUTO: 7 % (ref 0–13.4)
NEUTROPHILS # BLD AUTO: 5.64 K/UL (ref 2–7.15)
NEUTROPHILS NFR BLD: 64.9 % (ref 44–72)
NRBC # BLD AUTO: 0 K/UL
NRBC BLD AUTO-RTO: 0 /100 WBC
PLATELET # BLD AUTO: 348 K/UL (ref 164–446)
PMV BLD AUTO: 8.9 FL (ref 9–12.9)
POTASSIUM SERPL-SCNC: 3.9 MMOL/L (ref 3.6–5.5)
PROT SERPL-MCNC: 6.3 G/DL (ref 6–8.2)
RBC # BLD AUTO: 4.16 M/UL (ref 4.2–5.4)
SODIUM SERPL-SCNC: 133 MMOL/L (ref 135–145)
WBC # BLD AUTO: 8.7 K/UL (ref 4.8–10.8)

## 2017-01-07 PROCEDURE — 700117 HCHG RX CONTRAST REV CODE 255: Performed by: EMERGENCY MEDICINE

## 2017-01-07 PROCEDURE — 700111 HCHG RX REV CODE 636 W/ 250 OVERRIDE (IP): Performed by: EMERGENCY MEDICINE

## 2017-01-07 PROCEDURE — 74177 CT ABD & PELVIS W/CONTRAST: CPT

## 2017-01-07 PROCEDURE — 96376 TX/PRO/DX INJ SAME DRUG ADON: CPT

## 2017-01-07 RX ADMIN — FENTANYL CITRATE 100 MCG: 50 INJECTION, SOLUTION INTRAMUSCULAR; INTRAVENOUS at 01:02

## 2017-01-07 RX ADMIN — IOHEXOL 100 ML: 350 INJECTION, SOLUTION INTRAVENOUS at 00:34

## 2017-01-07 ASSESSMENT — PAIN SCALES - GENERAL
PAINLEVEL_OUTOF10: 9
PAINLEVEL_OUTOF10: 4

## 2017-01-07 NOTE — ED NOTES
Chief Complaint   Patient presents with   • Abdominal Pain     x2 hours. Patient fell and got severe sudden pain. Patient had a hysterectomy on tues. Very small amount of bleeding. abdomen is soft on palpation.   in WC to triage. Pt returned to lobby, educated on triage process, and to inform staff of any changes or concerns.

## 2017-01-07 NOTE — ED NOTES
Chief Complaint   Patient presents with   • Abdominal Pain     x2 hours. Patient fell and got severe sudden pain. Patient had a hysterectomy on tues. Very small amount of bleeding. abdomen is soft on palpation.   Pt ambulatory to triage with above complaint. Pt returned to lobby, educated on triage process, and to inform staff of any changes or concerns.

## 2017-01-07 NOTE — ED NOTES
"Pt presents with abdominal pain, recently had a hysterectomy (Tuesday), had mechanical fall tonight, \"I felt something pull behind my belly button and since then I've had a sharp stabbing pain there\" per pt report pain is midline abdominal.   "

## 2017-01-07 NOTE — ED NOTES
First contact with pt for discharge. Discharge instructions provided, pt verbalizes understanding. Pt awake, alert, VSS. Pt ambulatory with steady gait out to ER lobby. Called  to assist pt with ride home.

## 2017-01-07 NOTE — ED PROVIDER NOTES
ED Provider Note    CHIEF COMPLAINT  Chief Complaint   Patient presents with   • Abdominal Pain     x2 hours. Patient fell and got severe sudden pain. Patient had a hysterectomy on tues. Very small amount of bleeding. abdomen is soft on palpation.       HPI  Trevon Brennan is a 46 y.o. female who presents with abdominal discomfort. The patient fell approximately 2 hours prior to arrival and since that time she had the acute onset of pain below her umbilicus. She describes it as a tearing and sharp pain that is moderate to severe in intensity. The patient had a hysterectomy and bladder suspension performed on Tuesday and was discharged with no complications. She has not had any increase in vaginal bleeding nor does she have difficulty with urination. She does not have any fevers. She also denies nausea and vomiting. The pain is currently moderate in intensity.    REVIEW OF SYSTEMS  See HPI for further details. All other systems are negative.     PAST MEDICAL HISTORY  Past Medical History   Diagnosis Date   • migraines    • Urinary incontinence    • Heart burn    • Indigestion    • Bowel habit changes    • Depression 2016     anxiety and depression       SOCIAL HISTORY  Social History     Social History   • Marital Status: Single     Spouse Name: N/A   • Number of Children: N/A   • Years of Education: N/A     Social History Main Topics   • Smoking status: Never Smoker    • Smokeless tobacco: Never Used   • Alcohol Use: No   • Drug Use: No   • Sexual Activity: Not Asked     Other Topics Concern   • None     Social History Narrative           PHYSICAL EXAM  VITAL SIGNS: /51 mmHg  Pulse 67  Temp(Src) 36.6 °C (97.8 °F)  Resp 18  SpO2 96%  LMP  (Within Weeks)  Constitutional: Mild acute distress, Non-toxic appearance.   HENT: Normocephalic, Atraumatic, tympanic membranes are intact and nonerythematous bilaterally, Oropharynx moist without exudates or erythema, Nose normal.   Eyes: PERRLA, EOMI, Conjunctiva  normal.  Neck: Supple without meningismus  Lymphatic: No lymphadenopathy noted.   Cardiovascular: Normal heart rate, Normal rhythm, No murmurs, No rubs, No gallops.   Thorax & Lungs: Normal breath sounds, No respiratory distress, No wheezing, No chest tenderness.   Abdomen: Suprapubic abdominal discomfort to deep palpation, no rebound, no guarding, normal bowel sounds  Skin: Incision is clean, dry, and intact  Back: No tenderness, No CVA tenderness.   Extremities: Atraumatic with symmetric distal pulses, No edema, No tenderness, No cyanosis, No clubbing.   Neurologic: Alert & oriented x 3, cranial nerves II through XII are intact, Normal motor function, Normal sensory function, No focal deficits noted.   Psychiatric: Affect normal, Judgment normal, Mood normal.     RADIOLOGY/PROCEDURES  CT-ABDOMEN-PELVIS WITH   Final Result      1.  Trace amount of free fluid adjacent to the caecum. This is of unsettled significance. With respect to this finding and since a normal appendix could not be demonstrated, appendicitis cannot be excluded.   2.  No other acute findings.   3.  Mild sigmoid diverticulosis without diverticulitis.            COURSE & MEDICAL DECISION MAKING  Pertinent Labs & Imaging studies reviewed. (See chart for details)  46-year-old female who presents with abdominal pain after a fall. The CT scan does not show any evidence of acute bleeding or traumatic findings. I suspect the pain is from her aggravating the surgical site from her recent procedure. She seemed dynamically stable. The patient received intravenous narcotics for pain control and her abdomen continues to be nonsurgical. She'll be discharged home with clear instructions to follow-up with her gynecologist on Monday as scheduled. She'll return if she is acutely worse.    FINAL IMPRESSION  1. Abdominal pain     Disposition  The patient will be discharged in stable condition.    Electronically signed by: Viktor Wise, 1/6/2017 11:17 PM

## 2017-01-07 NOTE — ED NOTES
Chief Complaint   Patient presents with   • Abdominal Pain     x2 hours. Patient fell and got severe sudden pain. Patient had a hysterectomy on tues. Very small amount of bleeding. abdomen is soft on palpation.   Pt in W/C to triage with above complaint. Pt returned to lobby, educated on triage process, and to inform staff of any changes or concerns.

## 2017-01-07 NOTE — DISCHARGE INSTRUCTIONS
Abdominal Pain, Women  Abdominal (stomach, pelvic, or belly) pain can be caused by many things. It is important to tell your doctor:  · The location of the pain.  · Does it come and go or is it present all the time?  · Are there things that start the pain (eating certain foods, exercise)?  · Are there other symptoms associated with the pain (fever, nausea, vomiting, diarrhea)?  All of this is helpful to know when trying to find the cause of the pain.  CAUSES   · Stomach: virus or bacteria infection, or ulcer.  · Intestine: appendicitis (inflamed appendix), regional ileitis (Crohn's disease), ulcerative colitis (inflamed colon), irritable bowel syndrome, diverticulitis (inflamed diverticulum of the colon), or cancer of the stomach or intestine.  · Gallbladder disease or stones in the gallbladder.  · Kidney disease, kidney stones, or infection.  · Pancreas infection or cancer.  · Fibromyalgia (pain disorder).  · Diseases of the female organs:  · Uterus: fibroid (non-cancerous) tumors or infection.  · Fallopian tubes: infection or tubal pregnancy.  · Ovary: cysts or tumors.  · Pelvic adhesions (scar tissue).  · Endometriosis (uterus lining tissue growing in the pelvis and on the pelvic organs).  · Pelvic congestion syndrome (female organs filling up with blood just before the menstrual period).  · Pain with the menstrual period.  · Pain with ovulation (producing an egg).  · Pain with an IUD (intrauterine device, birth control) in the uterus.  · Cancer of the female organs.  · Functional pain (pain not caused by a disease, may improve without treatment).  · Psychological pain.  · Depression.  DIAGNOSIS   Your doctor will decide the seriousness of your pain by doing an examination.  · Blood tests.  · X-rays.  · Ultrasound.  · CT scan (computed tomography, special type of X-ray).  · MRI (magnetic resonance imaging).  · Cultures, for infection.  · Barium enema (dye inserted in the large intestine, to better view it with  X-rays).  · Colonoscopy (looking in intestine with a lighted tube).  · Laparoscopy (minor surgery, looking in abdomen with a lighted tube).  · Major abdominal exploratory surgery (looking in abdomen with a large incision).  TREATMENT   The treatment will depend on the cause of the pain.   · Many cases can be observed and treated at home.  · Over-the-counter medicines recommended by your caregiver.  · Prescription medicine.  · Antibiotics, for infection.  · Birth control pills, for painful periods or for ovulation pain.  · Hormone treatment, for endometriosis.  · Nerve blocking injections.  · Physical therapy.  · Antidepressants.  · Counseling with a psychologist or psychiatrist.  · Minor or major surgery.  HOME CARE INSTRUCTIONS   · Do not take laxatives, unless directed by your caregiver.  · Take over-the-counter pain medicine only if ordered by your caregiver. Do not take aspirin because it can cause an upset stomach or bleeding.  · Try a clear liquid diet (broth or water) as ordered by your caregiver. Slowly move to a bland diet, as tolerated, if the pain is related to the stomach or intestine.  · Have a thermometer and take your temperature several times a day, and record it.  · Bed rest and sleep, if it helps the pain.  · Avoid sexual intercourse, if it causes pain.  · Avoid stressful situations.  · Keep your follow-up appointments and tests, as your caregiver orders.  · If the pain does not go away with medicine or surgery, you may try:  · Acupuncture.  · Relaxation exercises (yoga, meditation).  · Group therapy.  · Counseling.  SEEK MEDICAL CARE IF:   · You notice certain foods cause stomach pain.  · Your home care treatment is not helping your pain.  · You need stronger pain medicine.  · You want your IUD removed.  · You feel faint or lightheaded.  · You develop nausea and vomiting.  · You develop a rash.  · You are having side effects or an allergy to your medicine.  SEEK IMMEDIATE MEDICAL CARE IF:   · Your  pain does not go away or gets worse.  · You have a fever.  · Your pain is felt only in portions of the abdomen. The right side could possibly be appendicitis. The left lower portion of the abdomen could be colitis or diverticulitis.  · You are passing blood in your stools (bright red or black tarry stools, with or without vomiting).  · You have blood in your urine.  · You develop chills, with or without a fever.  · You pass out.  MAKE SURE YOU:   · Understand these instructions.  · Will watch your condition.  · Will get help right away if you are not doing well or get worse.  Document Released: 10/14/2008 Document Revised: 03/11/2013 Document Reviewed: 11/04/2010  [employer]® Patient Information ©2014 [employer].

## 2018-10-24 ENCOUNTER — HOSPITAL ENCOUNTER (EMERGENCY)
Dept: HOSPITAL 8 - ED | Age: 48
Discharge: HOME | End: 2018-10-24
Payer: COMMERCIAL

## 2018-10-24 VITALS — BODY MASS INDEX: 21.72 KG/M2 | WEIGHT: 127.21 LBS | HEIGHT: 64 IN

## 2018-10-24 VITALS — SYSTOLIC BLOOD PRESSURE: 105 MMHG | DIASTOLIC BLOOD PRESSURE: 60 MMHG

## 2018-10-24 DIAGNOSIS — R11.2: Primary | ICD-10-CM

## 2018-10-24 DIAGNOSIS — R42: ICD-10-CM

## 2018-10-24 LAB
ALBUMIN SERPL-MCNC: 3.9 G/DL (ref 3.4–5)
ANION GAP SERPL CALC-SCNC: 9 MMOL/L (ref 5–15)
BASOPHILS # BLD AUTO: 0.04 X10^3/UL (ref 0–0.1)
BASOPHILS NFR BLD AUTO: 1 % (ref 0–1)
CALCIUM SERPL-MCNC: 8.8 MG/DL (ref 8.5–10.1)
CHLORIDE SERPL-SCNC: 106 MMOL/L (ref 98–107)
CREAT SERPL-MCNC: 0.65 MG/DL (ref 0.55–1.02)
CULTURE INDICATED?: NO
EOSINOPHIL # BLD AUTO: 0.09 X10^3/UL (ref 0–0.4)
EOSINOPHIL NFR BLD AUTO: 1 % (ref 1–7)
ERYTHROCYTE [DISTWIDTH] IN BLOOD BY AUTOMATED COUNT: 13.1 % (ref 9.6–15.2)
LYMPHOCYTES # BLD AUTO: 2.04 X10^3/UL (ref 1–3.4)
LYMPHOCYTES NFR BLD AUTO: 27 % (ref 22–44)
MCH RBC QN AUTO: 27.8 PG (ref 27–34.8)
MCHC RBC AUTO-ENTMCNC: 33.4 G/DL (ref 32.4–35.8)
MCV RBC AUTO: 83.3 FL (ref 80–100)
MD: NO
MICROSCOPIC: (no result)
MONOCYTES # BLD AUTO: 0.45 X10^3/UL (ref 0.2–0.8)
MONOCYTES NFR BLD AUTO: 6 % (ref 2–9)
NEUTROPHILS # BLD AUTO: 4.99 X10^3/UL (ref 1.8–6.8)
NEUTROPHILS NFR BLD AUTO: 66 % (ref 42–75)
PLATELET # BLD AUTO: 257 X10^3/UL (ref 130–400)
PMV BLD AUTO: 7.9 FL (ref 7.4–10.4)
RBC # BLD AUTO: 5.18 X10^6/UL (ref 3.82–5.3)

## 2018-10-24 PROCEDURE — 93005 ELECTROCARDIOGRAM TRACING: CPT

## 2018-10-24 PROCEDURE — 36415 COLL VENOUS BLD VENIPUNCTURE: CPT

## 2018-10-24 PROCEDURE — 81003 URINALYSIS AUTO W/O SCOPE: CPT

## 2018-10-24 PROCEDURE — 96361 HYDRATE IV INFUSION ADD-ON: CPT

## 2018-10-24 PROCEDURE — 82040 ASSAY OF SERUM ALBUMIN: CPT

## 2018-10-24 PROCEDURE — 85025 COMPLETE CBC W/AUTO DIFF WBC: CPT

## 2018-10-24 PROCEDURE — 80048 BASIC METABOLIC PNL TOTAL CA: CPT

## 2018-10-24 PROCEDURE — 99285 EMERGENCY DEPT VISIT HI MDM: CPT

## 2018-10-24 PROCEDURE — 96374 THER/PROPH/DIAG INJ IV PUSH: CPT

## 2019-01-30 ENCOUNTER — HOSPITAL ENCOUNTER (EMERGENCY)
Dept: HOSPITAL 8 - ED | Age: 49
Discharge: HOME | End: 2019-01-30
Payer: SELF-PAY

## 2019-01-30 VITALS — BODY MASS INDEX: 22.58 KG/M2 | HEIGHT: 64 IN | WEIGHT: 132.28 LBS

## 2019-01-30 VITALS — SYSTOLIC BLOOD PRESSURE: 134 MMHG | DIASTOLIC BLOOD PRESSURE: 65 MMHG

## 2019-01-30 DIAGNOSIS — B34.9: Primary | ICD-10-CM

## 2019-01-30 DIAGNOSIS — Z88.1: ICD-10-CM

## 2019-01-30 PROCEDURE — 87400 INFLUENZA A/B EACH AG IA: CPT

## 2019-01-30 PROCEDURE — 71046 X-RAY EXAM CHEST 2 VIEWS: CPT

## 2019-01-30 PROCEDURE — 99284 EMERGENCY DEPT VISIT MOD MDM: CPT

## 2019-01-30 NOTE — NUR
Patient/Caregiver given discharge instructions and they have confirmed that 
they understand the instructions.  Patient ambulatory with steady gait.  Pt. 
has her prescription with her.

## 2019-09-27 ENCOUNTER — OCCUPATIONAL MEDICINE (OUTPATIENT)
Dept: URGENT CARE | Facility: PHYSICIAN GROUP | Age: 49
End: 2019-09-27
Payer: COMMERCIAL

## 2019-09-27 VITALS
OXYGEN SATURATION: 98 % | WEIGHT: 115 LBS | BODY MASS INDEX: 21.16 KG/M2 | RESPIRATION RATE: 16 BRPM | HEIGHT: 62 IN | SYSTOLIC BLOOD PRESSURE: 144 MMHG | HEART RATE: 72 BPM | DIASTOLIC BLOOD PRESSURE: 90 MMHG | TEMPERATURE: 98.5 F

## 2019-09-27 DIAGNOSIS — S61.213A LACERATION OF LEFT MIDDLE FINGER WITHOUT FOREIGN BODY WITHOUT DAMAGE TO NAIL, INITIAL ENCOUNTER: ICD-10-CM

## 2019-09-27 PROCEDURE — 12001 RPR S/N/AX/GEN/TRNK 2.5CM/<: CPT | Mod: 29 | Performed by: PHYSICIAN ASSISTANT

## 2019-09-27 ASSESSMENT — ENCOUNTER SYMPTOMS
ROS SKIN COMMENTS: +LACERATION
EYE DISCHARGE: 0
HEADACHES: 0
COUGH: 0
EYE REDNESS: 0
FEVER: 0
NAUSEA: 0
VOMITING: 0

## 2019-09-27 ASSESSMENT — PAIN SCALES - GENERAL: PAINLEVEL: 5=MODERATE PAIN

## 2019-09-27 NOTE — PROGRESS NOTES
"Subjective:      Trevon Brennan is a 49 y.o. female who presents with Laceration (was cleaning a blade )          The patient presents to clinic for initial Workman's Comp evaluation.     DOI: 9/27/19 - The patient was using a slicing machine while at work when she cut the end of her left middle finger.  The patient states the incident happened around 1230 this afternoon.  She reports mild pain to the area.  No swelling.  No numbness, tingling, or weakness.  No damage to the nail.  The patient has not taken anything for her symptoms.  She reports no prior injury to her left hand.  The patient does not have a second job.  The patient is right-handed.     Laceration    The incident occurred 1 to 3 hours ago. Pain location: left middle finger. The laceration is 1 cm in size. The laceration mechanism was a clean knife. The pain is mild. She reports no foreign bodies present. Her tetanus status is UTD.     PMH: Reviewed in Epic, no pertinent findings.  MEDS: Reviewed in Epic.  ALLERGIES: Reviewed in Epic.  SURGHX: Reviewed in Epic  SOCHX: Reviewed in Epic  FH: Family history was reviewed, no pertinent findings to report    Review of Systems   Constitutional: Negative for fever.   HENT: Negative for congestion.    Eyes: Negative for discharge and redness.   Respiratory: Negative for cough.    Gastrointestinal: Negative for nausea and vomiting.   Musculoskeletal: Negative for joint pain.   Skin:        +laceration   Neurological: Negative for headaches.          Objective:     /90 (BP Location: Left arm, Patient Position: Sitting, BP Cuff Size: Adult)   Ht 1.575 m (5' 2\")   Wt 52.2 kg (115 lb)   LMP 03/01/2009   BMI 21.03 kg/m²      Physical Exam   Constitutional: She is oriented to person, place, and time. She appears well-developed and well-nourished. No distress.   HENT:   Head: Normocephalic and atraumatic.   Right Ear: External ear normal.   Left Ear: External ear normal.   Nose: Nose normal.   Eyes: " Conjunctivae and EOM are normal.   Neck: Normal range of motion. Neck supple.   Cardiovascular: Normal rate.   Pulmonary/Chest: Effort normal.   Musculoskeletal:   Left Middle Digit:  1cm superficial to the distal aspect of the left middle finger involving the pad of the finger. No active bleeding. No damage to the nail.   ROM intact - the patient demonstrates full active ROM  ROM against resistance intact  Neurovascular intact   Neurological: She is alert and oriented to person, place, and time.   Skin: Skin is warm and dry.          Progress:  Procedure: Laceration Repair  -Risks including bleeding, nerve damage, infection, and poor cosmetic outcome discussed at length. Benefits and alternatives discussed.   -Sterile technique throughout  -Local anesthesia with 1% lidocaine without epi = 1ml  -Closed with #2 5-0 Nylon interrupted sutures with good wound approximation  -Polysporin and dressing placed  -Patient tolerated well  -Return to clinic in 7 days for suture removal       Assessment/Plan:     1. Laceration of left middle finger without foreign body without damage to nail, initial encounter    Differential diagnoses, supportive care, and indications for immediate follow-up discussed with patient.   Instructed to return to clinic or nearest emergency department for any change in condition, further concerns, or worsening of symptoms.      Plan:  Light Duty Work Restrictions - D39/C4 provided  OTC NSAIDs for pain/discomfort  Ice  Keep wound clean and dry  Apply Polysporin or Neosporin to the area  Cover wound while at work  Follow-up in 7 days for suture removal   Return to clinic sooner if symptoms worsen, or if the patient should develop pain/tenderness, swelling, bruising, redness or warmth to the affected area, discharge/drainage from the wound, decreased range of motion, fever/chills, secondary signs of infection, and/or any concerning symptoms.    Discussed plan with the patient, and she agrees to the  above.

## 2019-09-27 NOTE — LETTER
[facility]  [address] [place] [address]. [address] - [place] [address]  Phone:  [phone] - Fax:  [phone]   [facility] Progress Report and Disability Certification  Date of Service: 9/27/2019   No Show:  No  Date / Time of Next Visit: 10/4/2019 @ 3:00 PM   Claim Information   Patient Name: Trevon Brennan  Claim Number:     Employer:  [employer] Date of Injury: 9/27/2019     Insurer / TPA:    ID / SSN:     Occupation:   Diagnosis: The encounter diagnosis was Laceration of left middle finger without foreign body without damage to nail, initial encounter.    Medical Information   Related to Industrial Injury? Yes    Subjective Complaints:  The patient presents to clinic for initial Workman's Comp evaluation.     DOI: 9/27/19 - The patient was using a slicing machine while at work when she cut the end of her left middle finger.  The patient states the incident happened around 1230 this afternoon.  She reports mild pain to the area.  No swelling.  No numbness, tingling, or weakness.  No damage to the nail.  The patient has not taken anything for her symptoms.  She reports no prior injury to her left hand.  The patient does not have a second job.  The patient is right-handed.    Laceration    The incident occurred 1 to 3 hours ago. Pain location: left middle finger. The laceration is 1 cm in size. The laceration mechanism was a clean knife. The pain is mild. She reports no foreign bodies present. Her tetanus status is UTD.      Objective Findings: Left Middle Digit:  1cm superficial to the distal aspect of the left middle finger involving the pad of the finger. No active bleeding. No damage to the nail.   ROM intact - the patient demonstrates full active ROM  ROM against resistance intact  Neurovascular intact   Pre-Existing Condition(s):     Assessment:   Initial Visit    Status: Additional Care Required  Permanent Disability:No    Plan:         Diagnostics:      Comments:  Plan:  Light Duty Work Restrictions - D39/C4 provided  OTC NSAIDs for pain/discomfort  Ice  Keep wound clean and dry  Apply Polysporin or Neosporin to the area  Cover wound while at work  Follow-up in 7 days for suture removal   Return to clinic soon  er if symptoms worsen, or if the patient should develop pain/tenderness, swelling, bruising, redness or warmth to the affected area, discharge/drainage from the wound, decreased range of motion, fever/chills, secondary signs of infection, and/or any   concerning symptoms.    Discussed plan with the patient, and she agrees to the above.     Disability Information   Status: Released to Restricted Duty    From:  9/27/2019  Through: 10/4/2019 Restrictions are: Temporary   Physical Restrictions   Sitting:    Standing:    Stooping:    Bending:      Squatting:    Walking:    Climbing:    Pushing:      Pulling:    Other:    Reaching Above Shoulder (L):   Reaching Above Shoulder (R):       Reaching Below Shoulder (L):    Reaching Below Shoulder (R):      Not to exceed Weight Limits   Carrying(hrs):   Weight Limit(lb):   Lifting(hrs):   Weight  Limit(lb):     Comments: Limited use of left hand. Cover wound while at work. Avoid soaking left hand. No washing dishes.    Repetitive Actions   Hands: i.e. Fine Manipulations from Grasping:     Feet: i.e. Operating Foot Controls:     Driving / Operate Machinery:     Physician Name: Jt Castaneda P.A.-C. Physician Signature: JT Gunn P.A.-C. e-Signature: Dr. Ge Parson, Medical Director   Clinic Name / Location: [address]  [place] [address] Clinic Phone Number: Dept: [phone]   Appointment Time: 1:20 Pm Visit Start Time: 1:35 PM   Check-In Time:  1:33 Pm Visit Discharge Time: 4:02 PM   Original-Treating Physician or Chiropractor    Page 2-Insurer/TPA    Page 3-Employer    Page 4-Employee

## 2019-09-27 NOTE — LETTER
"EMPLOYEE’S CLAIM FOR COMPENSATION/ REPORT OF INITIAL TREATMENT  FORM C-4    EMPLOYEE’S CLAIM - PROVIDE ALL INFORMATION REQUESTED   First Name  Trevon Last Name  Mika Birthdate                    1970                Sex  female Claim Number   Home Address  [address] Age  49 y.o. Height  1.575 m (5' 2\") Weight  52.2 kg (115 lb) [facility]     [facility] Zip  [address] Telephone  [phone] (home)    Mailing Address  [address] [facility] Zip  [address] Primary Language Spoken  English    Insurer   Third Party       Employee's Occupation (Job Title) When Injury or Occupational Disease Occurred      Employer's Name   [employer]  Telephone  [phone]    Employer Address  [address]  [place]  Zip  [address]    Date of Injury  9/27/2019               Hour of Injury  12:30 PM Date Employer Notified  9/27/2019 Last Day of Work after Injury or Occupational Disease  9/27/2019 Supervisor to Whom Injury Reported  Graciela Rojas/Kylah   Address or Location of Accident (if applicable)  [[employer]]   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 (Be specific an answer in detail. Use additional sheet if necessary)  I was wiping down the slicer and knicked my finger   If you believe that you have an occupational disease, when did you first have knowledge of the disability and it relationship to your employment?  N/A Witnesses to the Accident  None      Nature of Injury or Occupational Disease  Laceration  Part(s) of Body Injured or Affected  Finger (L), N/A, N/A    I certify that the above is true and correct to the best of my knowledge and that I have provided this information in order to obtain the benefits of [place] and [place] " [place]  I hereby authorize any physician, chiropractor, surgeon, practitioner, or other person, any hospital, including [facility] or [facility] hospital, any medical service organization, any insurance company, or other institution or organization to release to each other, any medical or other information, including benefits paid or payable, pertinent to this injury or disease, except information relative to diagnosis, treatment and/or counseling for AIDS, psychological conditions, alcohol or controlled substances, for which I must give specific authorization.  A Photostat of this authorization shall be as valid as the original.     Date   Place   Employee’s Signature   THIS REPORT MUST BE COMPLETED AND MAILED WITHIN 3 WORKING DAYS OF TREATMENT   Place  [facility] [place]  Name of Facility  [place]   Date  9/27/2019 Diagnosis  (S61.213A) Laceration of left middle finger without foreign body without damage to nail, initial encounter Is there evidence the injured employee was under the influence of alcohol and/or another controlled substance at the time of accident?   Hour  1:35 PM Description of Injury or Disease  The encounter diagnosis was Laceration of left middle finger without foreign body without damage to nail, initial encounter. No   Treatment  Plan:  Light Duty Work Restrictions - D39/C4 provided  OTC NSAIDs for pain/discomfort  Ice  Keep wound clean and dry  Apply Polysporin or Neosporin to the area  Cover wound while at work  Follow-up in 7 days for suture removal   Return to clinic sooner if symptoms worsen, or if the patient should develop pain/tenderness, swelling, bruising, redness or warmth to the affected area, discharge/drainage from the wound, decreased range of motion, fever/chills, secondary signs of infection, and/or any concerning symptoms.    Discussed plan with the patient, and she agrees to the above.    "Have you advised the patient to remain off work five days or more? No   X-Ray Findings      If Yes   From Date  To Date      From information given by the employee, together with medical evidence, can you directly connect this injury or occupational disease as job incurred?  Yes If No Full Duty  No Modified Duty  Yes   Is additional medical care by a physician indicated?  Yes If Modified Duty, Specify any Limitations / Restrictions  See D39   Do you know of any previous injury or disease contributing to this condition or occupational disease?                            No   Date  9/27/2019 Print Doctor’s Name Jt Castaneda P.A.-C. I certify the employer’s copy of  this form was mailed on:   Address  [address] [place] [address]. [address] Insurer’s Use Only     [facility]  [address]    Provider’s Tax ID Number  [id number] Telephone  Dept: [phone]        e-SignJT CASTANEDA P.A.-C.   e-Signature: Dr. Ge Parson, Medical Director Degree  MD        ORIGINAL-TREATING PHYSICIAN OR CHIROPRACTOR    PAGE 2-INSURER/TPA    PAGE 3-EMPLOYER    PAGE 4-EMPLOYEE             Form C-4 (rev.10/07)              BRIEF DESCRIPTION OF RIGHTS AND BENEFITS  (Pursuant to [place] 616[place])    Notice of Injury or Occupational Disease (Incident Report Form C-1): If an injury or occupational disease (OD) arises out of and in the course of employment, you must provide written notice to your employer as soon as practicable, but no later than 7 days after the accident or OD. Your employer shall maintain a sufficient supply of the required forms.    Claim for Compensation (Form C-4): If medical treatment is sought, the form C-4 is available at the place of initial treatment. A completed \"Claim for Compensation\" (Form C-4) must be filed within 90 days after an accident or OD. The treating physician or chiropractor must, within 3 working days after treatment, complete and mail to the employer, the employer's insurer and third-party " , the Claim for Compensation.    Medical Treatment: If you require medical treatment for your on-the-job injury or OD, you may be required to select a physician or chiropractor from a list provided by your workers’ compensation insurer, if it has contracted with an Organization for Managed Care (MCO) or Preferred Provider Organization (PPO) or providers of health care. If your employer has not entered into a contract with an MCO or PPO, you may select a physician or chiropractor from the Panel of Physicians and Chiropractors. Any medical costs related to your industrial injury or OD will be paid by your insurer.    Temporary Total Disability (TTD): If your doctor has certified that you are unable to work for a period of at least 5 consecutive days, or 5 cumulative days in a 20-day period, or places restrictions on you that your employer does not accommodate, you may be entitled to TTD compensation.    Temporary Partial Disability (TPD): If the wage you receive upon reemployment is less than the compensation for TTD to which you are entitled, the insurer may be required to pay you TPD compensation to make up the difference. TPD can only be paid for a maximum of 24 months.    Permanent Partial Disability (PPD): When your medical condition is stable and there is an indication of a PPD as a result of your injury or OD, within 30 days, your insurer must arrange for an evaluation by a rating physician or chiropractor to determine the degree of your PPD. The amount of your PPD award depends on the date of injury, the results of the PPD evaluation and your age and wage.    Permanent Total Disability (PTD): If you are medically certified by a treating physician or chiropractor as permanently and totally disabled and have been granted a PTD status by your insurer, you are entitled to receive monthly benefits not to exceed 66 2/3% of your average monthly wage. The amount of your PTD payments is subject to  reduction if you previously received a PPD award.    Vocational Rehabilitation Services: You may be eligible for vocational rehabilitation services if you are unable to return to the job due to a permanent physical impairment or permanent restrictions as a result of your injury or occupational disease.    Transportation and Per Caryl Reimbursement: You may be eligible for travel expenses and per caryl associated with medical treatment.    Reopening: You may be able to reopen your claim if your condition worsens after claim closure.    Appeal Process: If you disagree with a written determination issued by the insurer or the insurer does not respond to your request, you may appeal to the Department of Administration, , by following the instructions contained in your determination letter. You must appeal the determination within 70 days from the date of the determination letter at [address], [address], [place], [place] [address], or [address], [address], [place] [address]. If you disagree with the  decision, you may appeal to the Department of Administration, . You must file your appeal within 30 days from the date of the  decision letter at [address], [address], [place], [place] [address], or [address], [address], [place] [address]. If you disagree with a decision of an , you may file a petition for judicial review with the District Court. You must do so within 30 days of the Appeal Officer’s decision. You may be represented by an  at your own expense or you may contact the [facility] for possible representation.    [place]  for Injured Workers (NAIW): If you disagree with a  decision, you may request that NAIW represent you without charge at an  Hearing. For information regarding denial of benefits, you may contact the [facility] at: [address], [address], [place] [address], [phone], or [address], [address], [place] [address], [phone]    To File a Complaint with the Division: If you wish to file a complaint with the  of the Division of Industrial Relations (DIR),  please contact the Workers’ Compensation Section, [address], [address], [place], [place] [address], telephone (733) 736-2005, or [address], [address], [place], [place] [address], telephone [phone].    For assistance with Workers’ Compensation Issues: you may contact the Office of the Governor Consumer Health Assistance, [address] [place] [address], [address], [place], [place] [address], Toll Free 1-473.867.7833, Web site: [email or web address]., E-mail [email or web address].                             __________________________________________________________________                                                                   _________________                Employee Name / Signature                                                                                                                                                       Date                                                                                                                                                                                                     D-2 (rev. 06/18)

## 2019-11-16 ENCOUNTER — HOSPITAL ENCOUNTER (EMERGENCY)
Dept: HOSPITAL 8 - ED | Age: 49
Discharge: HOME | End: 2019-11-16
Payer: SELF-PAY

## 2019-11-16 VITALS — BODY MASS INDEX: 21.66 KG/M2 | HEIGHT: 62 IN | WEIGHT: 117.73 LBS

## 2019-11-16 VITALS — DIASTOLIC BLOOD PRESSURE: 65 MMHG | SYSTOLIC BLOOD PRESSURE: 118 MMHG

## 2019-11-16 DIAGNOSIS — R11.2: Primary | ICD-10-CM

## 2019-11-16 DIAGNOSIS — J45.909: ICD-10-CM

## 2019-11-16 DIAGNOSIS — Z90.49: ICD-10-CM

## 2019-11-16 DIAGNOSIS — Z90.710: ICD-10-CM

## 2019-11-16 LAB
ALBUMIN SERPL-MCNC: 4.3 G/DL (ref 3.4–5)
ALP SERPL-CCNC: 80 U/L (ref 45–117)
ALT SERPL-CCNC: 36 U/L (ref 12–78)
ANION GAP SERPL CALC-SCNC: 11 MMOL/L (ref 5–15)
BASOPHILS # BLD AUTO: 0 X10^3/UL (ref 0–0.1)
BASOPHILS NFR BLD AUTO: 0 % (ref 0–1)
BILIRUB SERPL-MCNC: 0.4 MG/DL (ref 0.2–1)
CALCIUM SERPL-MCNC: 8.9 MG/DL (ref 8.5–10.1)
CHLORIDE SERPL-SCNC: 109 MMOL/L (ref 98–107)
CREAT SERPL-MCNC: 0.7 MG/DL (ref 0.55–1.02)
CULTURE INDICATED?: NO
EOSINOPHIL # BLD AUTO: 0.01 X10^3/UL (ref 0–0.4)
EOSINOPHIL NFR BLD AUTO: 0 % (ref 1–7)
ERYTHROCYTE [DISTWIDTH] IN BLOOD BY AUTOMATED COUNT: 13.3 % (ref 9.6–15.2)
LYMPHOCYTES # BLD AUTO: 1.07 X10^3/UL (ref 1–3.4)
LYMPHOCYTES NFR BLD AUTO: 13 % (ref 22–44)
MCH RBC QN AUTO: 28.7 PG (ref 27–34.8)
MCHC RBC AUTO-ENTMCNC: 33.3 G/DL (ref 32.4–35.8)
MCV RBC AUTO: 86.2 FL (ref 80–100)
MD: NO
MICROSCOPIC: (no result)
MONOCYTES # BLD AUTO: 0.3 X10^3/UL (ref 0.2–0.8)
MONOCYTES NFR BLD AUTO: 4 % (ref 2–9)
NEUTROPHILS # BLD AUTO: 6.93 X10^3/UL (ref 1.8–6.8)
NEUTROPHILS NFR BLD AUTO: 83 % (ref 42–75)
PLATELET # BLD AUTO: 268 X10^3/UL (ref 130–400)
PMV BLD AUTO: 6.5 FL (ref 7.4–10.4)
PROT SERPL-MCNC: 7.6 G/DL (ref 6.4–8.2)
RBC # BLD AUTO: 5.17 X10^6/UL (ref 3.82–5.3)

## 2019-11-16 PROCEDURE — 96374 THER/PROPH/DIAG INJ IV PUSH: CPT

## 2019-11-16 PROCEDURE — 96372 THER/PROPH/DIAG INJ SC/IM: CPT

## 2019-11-16 PROCEDURE — 81001 URINALYSIS AUTO W/SCOPE: CPT

## 2019-11-16 PROCEDURE — 83690 ASSAY OF LIPASE: CPT

## 2019-11-16 PROCEDURE — 99283 EMERGENCY DEPT VISIT LOW MDM: CPT

## 2019-11-16 PROCEDURE — 96361 HYDRATE IV INFUSION ADD-ON: CPT

## 2019-11-16 PROCEDURE — 85025 COMPLETE CBC W/AUTO DIFF WBC: CPT

## 2019-11-16 PROCEDURE — 36415 COLL VENOUS BLD VENIPUNCTURE: CPT

## 2019-11-16 PROCEDURE — 80053 COMPREHEN METABOLIC PANEL: CPT

## 2019-11-16 PROCEDURE — 96375 TX/PRO/DX INJ NEW DRUG ADDON: CPT

## 2019-11-16 NOTE — NUR
PT WITH C/O N/V SINCE EARLY THIS AM, PT DRY HEAVING UPON ENTERING THE RM. PT 
STATES SHE HAS HAD SOME GEN ABD PAIN D/T DRY HEAVING, DENIES DIARRHEA. 



ERMD IN TO EVAL PT, ORDERS RECIEVED. PIV INITIATED, PT MEDICATED PER MAR



PT TO BP, CONT PULSE OX

## 2020-08-02 ENCOUNTER — HOSPITAL ENCOUNTER (EMERGENCY)
Dept: HOSPITAL 8 - ED | Age: 50
Discharge: HOME | End: 2020-08-02
Payer: SELF-PAY

## 2020-08-02 VITALS — WEIGHT: 147.93 LBS | HEIGHT: 62 IN | BODY MASS INDEX: 27.22 KG/M2

## 2020-08-02 VITALS — SYSTOLIC BLOOD PRESSURE: 151 MMHG | DIASTOLIC BLOOD PRESSURE: 72 MMHG

## 2020-08-02 DIAGNOSIS — G43.909: ICD-10-CM

## 2020-08-02 DIAGNOSIS — J45.909: ICD-10-CM

## 2020-08-02 DIAGNOSIS — M79.644: Primary | ICD-10-CM

## 2020-08-02 DIAGNOSIS — L03.011: ICD-10-CM

## 2020-08-02 PROCEDURE — 99283 EMERGENCY DEPT VISIT LOW MDM: CPT

## 2020-08-02 PROCEDURE — 90715 TDAP VACCINE 7 YRS/> IM: CPT

## 2020-08-02 PROCEDURE — 90471 IMMUNIZATION ADMIN: CPT

## 2020-08-09 ENCOUNTER — HOSPITAL ENCOUNTER (EMERGENCY)
Dept: HOSPITAL 8 - ED | Age: 50
Discharge: HOME | End: 2020-08-09
Payer: SELF-PAY

## 2020-08-09 VITALS — WEIGHT: 143.3 LBS | HEIGHT: 62 IN | BODY MASS INDEX: 26.37 KG/M2

## 2020-08-09 VITALS — DIASTOLIC BLOOD PRESSURE: 95 MMHG | SYSTOLIC BLOOD PRESSURE: 146 MMHG

## 2020-08-09 DIAGNOSIS — J45.909: ICD-10-CM

## 2020-08-09 DIAGNOSIS — R19.7: ICD-10-CM

## 2020-08-09 DIAGNOSIS — T36.1X5A: ICD-10-CM

## 2020-08-09 DIAGNOSIS — R11.2: ICD-10-CM

## 2020-08-09 DIAGNOSIS — R94.31: ICD-10-CM

## 2020-08-09 DIAGNOSIS — I44.4: ICD-10-CM

## 2020-08-09 DIAGNOSIS — Z90.710: ICD-10-CM
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
## 2020-08-09 DIAGNOSIS — Z90.89: ICD-10-CM

## 2020-08-09 DIAGNOSIS — G43.019: Primary | ICD-10-CM

## 2020-08-09 DIAGNOSIS — Z90.49: ICD-10-CM

## 2020-08-09 LAB
ALBUMIN SERPL-MCNC: 3.8 G/DL (ref 3.4–5)
ALP SERPL-CCNC: 139 U/L (ref 45–117)
ALT SERPL-CCNC: 36 U/L (ref 12–78)
ANION GAP SERPL CALC-SCNC: 10 MMOL/L (ref 5–15)
BASOPHILS # BLD AUTO: 0.02 X10^3/UL (ref 0–0.1)
BASOPHILS NFR BLD AUTO: 0 % (ref 0–1)
BILIRUB SERPL-MCNC: 0.4 MG/DL (ref 0.2–1)
CALCIUM SERPL-MCNC: 8.3 MG/DL (ref 8.5–10.1)
CHLORIDE SERPL-SCNC: 110 MMOL/L (ref 98–107)
CREAT SERPL-MCNC: 0.69 MG/DL (ref 0.55–1.02)
EOSINOPHIL # BLD AUTO: 0.01 X10^3/UL (ref 0–0.4)
EOSINOPHIL NFR BLD AUTO: 0 % (ref 1–7)
ERYTHROCYTE [DISTWIDTH] IN BLOOD BY AUTOMATED COUNT: 13.6 % (ref 9.6–15.2)
LYMPHOCYTES # BLD AUTO: 0.8 X10^3/UL (ref 1–3.4)
LYMPHOCYTES NFR BLD AUTO: 12 % (ref 22–44)
MCH RBC QN AUTO: 28.8 PG (ref 27–34.8)
MCHC RBC AUTO-ENTMCNC: 33.5 G/DL (ref 32.4–35.8)
MCV RBC AUTO: 86 FL (ref 80–100)
MD: (no result)
MICROSCOPIC: (no result)
MONOCYTES # BLD AUTO: 0.33 X10^3/UL (ref 0.2–0.8)
MONOCYTES NFR BLD AUTO: 5 % (ref 2–9)
NEUTROPHILS # BLD AUTO: 5.64 X10^3/UL (ref 1.8–6.8)
NEUTROPHILS NFR BLD AUTO: 83 % (ref 42–75)
PLATELET # BLD AUTO: 341 X10^3/UL (ref 130–400)
PMV BLD AUTO: 6.4 FL (ref 7.4–10.4)
PROT SERPL-MCNC: 7.6 G/DL (ref 6.4–8.2)
RBC # BLD AUTO: 4.9 X10^6/UL (ref 3.82–5.3)

## 2020-08-09 PROCEDURE — 81001 URINALYSIS AUTO W/SCOPE: CPT

## 2020-08-09 PROCEDURE — 80053 COMPREHEN METABOLIC PANEL: CPT

## 2020-08-09 PROCEDURE — 96375 TX/PRO/DX INJ NEW DRUG ADDON: CPT

## 2020-08-09 PROCEDURE — 85025 COMPLETE CBC W/AUTO DIFF WBC: CPT

## 2020-08-09 PROCEDURE — 96374 THER/PROPH/DIAG INJ IV PUSH: CPT

## 2020-08-09 PROCEDURE — 36415 COLL VENOUS BLD VENIPUNCTURE: CPT

## 2020-08-09 PROCEDURE — 96361 HYDRATE IV INFUSION ADD-ON: CPT

## 2020-08-09 PROCEDURE — 99284 EMERGENCY DEPT VISIT MOD MDM: CPT

## 2020-08-09 PROCEDURE — 96372 THER/PROPH/DIAG INJ SC/IM: CPT

## 2020-08-09 PROCEDURE — 83690 ASSAY OF LIPASE: CPT

## 2020-08-09 PROCEDURE — 93005 ELECTROCARDIOGRAM TRACING: CPT

## 2020-08-09 NOTE — NUR
break RN note: report received from RADHA Morrissey. pt placed on all monitors. pt is 
a&o, resps even and unlabored, nsr on cardiac monitor with no ectopy. edmd hernández notified pt had already received 4 mg zofran pta by ems, MD clark'd second 
dose. pt medicated per emar, tolerated well. call light in reach. EKG in 
progress by EDT.

## 2020-11-07 ENCOUNTER — HOSPITAL ENCOUNTER (EMERGENCY)
Dept: HOSPITAL 8 - ED | Age: 50
Discharge: HOME | End: 2020-11-07
Payer: SELF-PAY

## 2020-11-07 VITALS — WEIGHT: 157.63 LBS | BODY MASS INDEX: 29.01 KG/M2 | HEIGHT: 62 IN

## 2020-11-07 VITALS — SYSTOLIC BLOOD PRESSURE: 145 MMHG | DIASTOLIC BLOOD PRESSURE: 69 MMHG

## 2020-11-07 DIAGNOSIS — R11.2: ICD-10-CM

## 2020-11-07 DIAGNOSIS — Z90.89: ICD-10-CM

## 2020-11-07 DIAGNOSIS — R51.9: Primary | ICD-10-CM

## 2020-11-07 DIAGNOSIS — Z90.710: ICD-10-CM

## 2020-11-07 DIAGNOSIS — Z90.49: ICD-10-CM

## 2020-11-07 PROCEDURE — 96375 TX/PRO/DX INJ NEW DRUG ADDON: CPT

## 2020-11-07 PROCEDURE — 96361 HYDRATE IV INFUSION ADD-ON: CPT

## 2020-11-07 PROCEDURE — 96374 THER/PROPH/DIAG INJ IV PUSH: CPT

## 2020-11-07 PROCEDURE — 70450 CT HEAD/BRAIN W/O DYE: CPT

## 2020-11-07 PROCEDURE — 99284 EMERGENCY DEPT VISIT MOD MDM: CPT

## 2021-08-23 ENCOUNTER — HOSPITAL ENCOUNTER (EMERGENCY)
Dept: HOSPITAL 8 - ED | Age: 51
Discharge: HOME | End: 2021-08-23
Payer: MEDICAID

## 2021-08-23 VITALS — DIASTOLIC BLOOD PRESSURE: 80 MMHG | SYSTOLIC BLOOD PRESSURE: 130 MMHG

## 2021-08-23 VITALS — WEIGHT: 169.09 LBS | HEIGHT: 62 IN | BODY MASS INDEX: 31.12 KG/M2

## 2021-08-23 DIAGNOSIS — J98.01: Primary | ICD-10-CM

## 2021-08-23 DIAGNOSIS — Z90.710: ICD-10-CM

## 2021-08-23 DIAGNOSIS — R00.0: ICD-10-CM

## 2021-08-23 DIAGNOSIS — Z90.89: ICD-10-CM

## 2021-08-23 DIAGNOSIS — Z90.49: ICD-10-CM

## 2021-08-23 PROCEDURE — 99283 EMERGENCY DEPT VISIT LOW MDM: CPT

## 2021-08-23 PROCEDURE — 94640 AIRWAY INHALATION TREATMENT: CPT

## 2021-08-23 PROCEDURE — 93005 ELECTROCARDIOGRAM TRACING: CPT

## 2021-08-23 PROCEDURE — 71045 X-RAY EXAM CHEST 1 VIEW: CPT

## 2021-08-23 NOTE — NUR
PT. REPORTS SHE IS FEELING MUCH BETTER AFTER BREATHING TX.  PT. VS UPDATED.  
PT. ALSO REPORTS COUGH HAS IMPROVED.  AWAITING CHEST X-RAY RESULTS.

## 2021-08-23 NOTE — NUR
PT. TO ED WITH C/O COUGH X 2 DAYS ASSOCIATED WITH SOB "FEELS HEAVY IN MY CHEST 
LIKE I CAN'T CATCH A FULL BREATH."  PT. DENIES CP.  C/O HA BUT STATES THIS IS A 
COMMON PROBLEM FOR HER ESPECIALLY WITH THE SMOKE IN THE AIR.  INITIALLY PT. HAD 
RAPID, NON-LABORED RESPIRATIONS AT A RATE OF ABOUT 30;  PT. EXPRESSED THAT HER 
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  AFTER PT. WAS COACHED ON BREATHING REPIRATIONS BECOME 
NORMAL/NON-LABORED.  



NO COVID VACCINE.  DENIES KNOWN EXPOSURES.  



EKG IN TRIAGE.  ALL MONITORS PLACED IN ROOM.

## 2021-08-23 NOTE — NUR
pt medicated per order, educated on dc instructions, verbalized understanding. 
ambulatory to dc desk with steady gait.

## 2021-12-17 ENCOUNTER — HOSPITAL ENCOUNTER (INPATIENT)
Facility: MEDICAL CENTER | Age: 51
LOS: 2 days | DRG: 894 | End: 2021-12-19
Attending: EMERGENCY MEDICINE | Admitting: INTERNAL MEDICINE
Payer: COMMERCIAL

## 2021-12-17 DIAGNOSIS — E87.6 HYPOKALEMIA: ICD-10-CM

## 2021-12-17 DIAGNOSIS — F10.931 ALCOHOL WITHDRAWAL SYNDROME, WITH DELIRIUM (HCC): ICD-10-CM

## 2021-12-17 DIAGNOSIS — F10.931 ALCOHOL WITHDRAWAL DELIRIUM (HCC): Primary | ICD-10-CM

## 2021-12-17 DIAGNOSIS — K29.20 ACUTE ALCOHOLIC GASTRITIS WITHOUT HEMORRHAGE: ICD-10-CM

## 2021-12-17 LAB
ALBUMIN SERPL BCP-MCNC: 5.1 G/DL (ref 3.2–4.9)
ALBUMIN/GLOB SERPL: 1.8 G/DL
ALP SERPL-CCNC: 148 U/L (ref 30–99)
ALT SERPL-CCNC: 47 U/L (ref 2–50)
ANION GAP SERPL CALC-SCNC: 21 MMOL/L (ref 7–16)
AST SERPL-CCNC: 61 U/L (ref 12–45)
BASOPHILS # BLD AUTO: 0.6 % (ref 0–1.8)
BASOPHILS # BLD: 0.05 K/UL (ref 0–0.12)
BILIRUB SERPL-MCNC: 1.2 MG/DL (ref 0.1–1.5)
BUN SERPL-MCNC: 9 MG/DL (ref 8–22)
CALCIUM SERPL-MCNC: 9.5 MG/DL (ref 8.5–10.5)
CHLORIDE SERPL-SCNC: 97 MMOL/L (ref 96–112)
CO2 SERPL-SCNC: 20 MMOL/L (ref 20–33)
CREAT SERPL-MCNC: 0.54 MG/DL (ref 0.5–1.4)
EKG IMPRESSION: NORMAL
EOSINOPHIL # BLD AUTO: 0.06 K/UL (ref 0–0.51)
EOSINOPHIL NFR BLD: 0.8 % (ref 0–6.9)
ERYTHROCYTE [DISTWIDTH] IN BLOOD BY AUTOMATED COUNT: 42.5 FL (ref 35.9–50)
ETHANOL BLD-MCNC: <10.1 MG/DL (ref 0–10)
GLOBULIN SER CALC-MCNC: 2.9 G/DL (ref 1.9–3.5)
GLUCOSE SERPL-MCNC: 120 MG/DL (ref 65–99)
HCT VFR BLD AUTO: 44.6 % (ref 37–47)
HGB BLD-MCNC: 15.4 G/DL (ref 12–16)
IMM GRANULOCYTES # BLD AUTO: 0.03 K/UL (ref 0–0.11)
IMM GRANULOCYTES NFR BLD AUTO: 0.4 % (ref 0–0.9)
LIPASE SERPL-CCNC: 28 U/L (ref 11–82)
LYMPHOCYTES # BLD AUTO: 1.12 K/UL (ref 1–4.8)
LYMPHOCYTES NFR BLD: 14.5 % (ref 22–41)
MCH RBC QN AUTO: 29.3 PG (ref 27–33)
MCHC RBC AUTO-ENTMCNC: 34.5 G/DL (ref 33.6–35)
MCV RBC AUTO: 84.8 FL (ref 81.4–97.8)
MONOCYTES # BLD AUTO: 0.54 K/UL (ref 0–0.85)
MONOCYTES NFR BLD AUTO: 7 % (ref 0–13.4)
NEUTROPHILS # BLD AUTO: 5.91 K/UL (ref 2–7.15)
NEUTROPHILS NFR BLD: 76.7 % (ref 44–72)
NRBC # BLD AUTO: 0 K/UL
NRBC BLD-RTO: 0 /100 WBC
PLATELET # BLD AUTO: 228 K/UL (ref 164–446)
PMV BLD AUTO: 9.3 FL (ref 9–12.9)
POTASSIUM SERPL-SCNC: 3.4 MMOL/L (ref 3.6–5.5)
PROT SERPL-MCNC: 8 G/DL (ref 6–8.2)
RBC # BLD AUTO: 5.26 M/UL (ref 4.2–5.4)
SODIUM SERPL-SCNC: 138 MMOL/L (ref 135–145)
WBC # BLD AUTO: 7.7 K/UL (ref 4.8–10.8)

## 2021-12-17 PROCEDURE — 99222 1ST HOSP IP/OBS MODERATE 55: CPT | Performed by: INTERNAL MEDICINE

## 2021-12-17 PROCEDURE — 96375 TX/PRO/DX INJ NEW DRUG ADDON: CPT

## 2021-12-17 PROCEDURE — 85025 COMPLETE CBC W/AUTO DIFF WBC: CPT

## 2021-12-17 PROCEDURE — 700105 HCHG RX REV CODE 258: Performed by: INTERNAL MEDICINE

## 2021-12-17 PROCEDURE — A9270 NON-COVERED ITEM OR SERVICE: HCPCS | Performed by: INTERNAL MEDICINE

## 2021-12-17 PROCEDURE — 700102 HCHG RX REV CODE 250 W/ 637 OVERRIDE(OP): Performed by: INTERNAL MEDICINE

## 2021-12-17 PROCEDURE — 99285 EMERGENCY DEPT VISIT HI MDM: CPT

## 2021-12-17 PROCEDURE — 96365 THER/PROPH/DIAG IV INF INIT: CPT

## 2021-12-17 PROCEDURE — 700101 HCHG RX REV CODE 250: Performed by: EMERGENCY MEDICINE

## 2021-12-17 PROCEDURE — 93005 ELECTROCARDIOGRAM TRACING: CPT | Performed by: EMERGENCY MEDICINE

## 2021-12-17 PROCEDURE — HZ2ZZZZ DETOXIFICATION SERVICES FOR SUBSTANCE ABUSE TREATMENT: ICD-10-PCS | Performed by: EMERGENCY MEDICINE

## 2021-12-17 PROCEDURE — 700111 HCHG RX REV CODE 636 W/ 250 OVERRIDE (IP): Performed by: EMERGENCY MEDICINE

## 2021-12-17 PROCEDURE — 700111 HCHG RX REV CODE 636 W/ 250 OVERRIDE (IP): Performed by: INTERNAL MEDICINE

## 2021-12-17 PROCEDURE — 82077 ASSAY SPEC XCP UR&BREATH IA: CPT

## 2021-12-17 PROCEDURE — 96376 TX/PRO/DX INJ SAME DRUG ADON: CPT

## 2021-12-17 PROCEDURE — 770020 HCHG ROOM/CARE - TELE (206)

## 2021-12-17 PROCEDURE — 83690 ASSAY OF LIPASE: CPT

## 2021-12-17 PROCEDURE — 80053 COMPREHEN METABOLIC PANEL: CPT

## 2021-12-17 PROCEDURE — 96366 THER/PROPH/DIAG IV INF ADDON: CPT

## 2021-12-17 RX ORDER — SODIUM CHLORIDE 9 MG/ML
INJECTION, SOLUTION INTRAVENOUS CONTINUOUS
Status: DISCONTINUED | OUTPATIENT
Start: 2021-12-17 | End: 2021-12-18

## 2021-12-17 RX ORDER — LORAZEPAM 2 MG/ML
1.5 INJECTION INTRAMUSCULAR
Status: DISCONTINUED | OUTPATIENT
Start: 2021-12-17 | End: 2021-12-19 | Stop reason: HOSPADM

## 2021-12-17 RX ORDER — GAUZE BANDAGE 2" X 2"
100 BANDAGE TOPICAL DAILY
Status: DISCONTINUED | OUTPATIENT
Start: 2021-12-18 | End: 2021-12-17

## 2021-12-17 RX ORDER — ONDANSETRON 2 MG/ML
4 INJECTION INTRAMUSCULAR; INTRAVENOUS ONCE
Status: COMPLETED | OUTPATIENT
Start: 2021-12-17 | End: 2021-12-17

## 2021-12-17 RX ORDER — LORAZEPAM 2 MG/1
4 TABLET ORAL
Status: DISCONTINUED | OUTPATIENT
Start: 2021-12-17 | End: 2021-12-19 | Stop reason: HOSPADM

## 2021-12-17 RX ORDER — LORAZEPAM 1 MG/1
1 TABLET ORAL EVERY 4 HOURS PRN
Status: DISCONTINUED | OUTPATIENT
Start: 2021-12-17 | End: 2021-12-19 | Stop reason: HOSPADM

## 2021-12-17 RX ORDER — LABETALOL HYDROCHLORIDE 5 MG/ML
10 INJECTION, SOLUTION INTRAVENOUS EVERY 4 HOURS PRN
Status: DISCONTINUED | OUTPATIENT
Start: 2021-12-17 | End: 2021-12-19 | Stop reason: HOSPADM

## 2021-12-17 RX ORDER — LORAZEPAM 2 MG/ML
2 INJECTION INTRAMUSCULAR ONCE
Status: COMPLETED | OUTPATIENT
Start: 2021-12-17 | End: 2021-12-17

## 2021-12-17 RX ORDER — AMOXICILLIN 250 MG
2 CAPSULE ORAL 2 TIMES DAILY
Status: DISCONTINUED | OUTPATIENT
Start: 2021-12-18 | End: 2021-12-19

## 2021-12-17 RX ORDER — LORAZEPAM 2 MG/ML
2 INJECTION INTRAMUSCULAR
Status: DISCONTINUED | OUTPATIENT
Start: 2021-12-17 | End: 2021-12-19 | Stop reason: HOSPADM

## 2021-12-17 RX ORDER — BISACODYL 10 MG
10 SUPPOSITORY, RECTAL RECTAL
Status: DISCONTINUED | OUTPATIENT
Start: 2021-12-17 | End: 2021-12-19

## 2021-12-17 RX ORDER — LORAZEPAM 0.5 MG/1
0.5 TABLET ORAL EVERY 4 HOURS PRN
Status: DISCONTINUED | OUTPATIENT
Start: 2021-12-17 | End: 2021-12-19 | Stop reason: HOSPADM

## 2021-12-17 RX ORDER — LORAZEPAM 2 MG/1
2 TABLET ORAL
Status: DISCONTINUED | OUTPATIENT
Start: 2021-12-17 | End: 2021-12-19 | Stop reason: HOSPADM

## 2021-12-17 RX ORDER — POLYETHYLENE GLYCOL 3350 17 G/17G
1 POWDER, FOR SOLUTION ORAL
Status: DISCONTINUED | OUTPATIENT
Start: 2021-12-17 | End: 2021-12-19

## 2021-12-17 RX ORDER — FAMOTIDINE 20 MG/1
20 TABLET, FILM COATED ORAL PRN
Status: DISCONTINUED | OUTPATIENT
Start: 2021-12-17 | End: 2021-12-17

## 2021-12-17 RX ORDER — CALCIUM CARBONATE 750 MG/1
3-4 TABLET ORAL
COMMUNITY

## 2021-12-17 RX ORDER — LORAZEPAM 2 MG/ML
0.5 INJECTION INTRAMUSCULAR EVERY 4 HOURS PRN
Status: DISCONTINUED | OUTPATIENT
Start: 2021-12-17 | End: 2021-12-19 | Stop reason: HOSPADM

## 2021-12-17 RX ORDER — FOLIC ACID 1 MG/1
1 TABLET ORAL DAILY
Status: DISCONTINUED | OUTPATIENT
Start: 2021-12-18 | End: 2021-12-17

## 2021-12-17 RX ORDER — GAUZE BANDAGE 2" X 2"
100 BANDAGE TOPICAL DAILY
Status: DISCONTINUED | OUTPATIENT
Start: 2021-12-18 | End: 2021-12-19 | Stop reason: HOSPADM

## 2021-12-17 RX ORDER — PAROXETINE HYDROCHLORIDE 40 MG/1
40 TABLET, FILM COATED ORAL DAILY
Status: DISCONTINUED | OUTPATIENT
Start: 2021-12-17 | End: 2021-12-17

## 2021-12-17 RX ORDER — BACLOFEN 10 MG/1
10 TABLET ORAL DAILY
Status: DISCONTINUED | OUTPATIENT
Start: 2021-12-17 | End: 2021-12-17

## 2021-12-17 RX ORDER — LORAZEPAM 2 MG/ML
1 INJECTION INTRAMUSCULAR
Status: DISCONTINUED | OUTPATIENT
Start: 2021-12-17 | End: 2021-12-19 | Stop reason: HOSPADM

## 2021-12-17 RX ORDER — FOLIC ACID 1 MG/1
1 TABLET ORAL DAILY
Status: DISCONTINUED | OUTPATIENT
Start: 2021-12-18 | End: 2021-12-19 | Stop reason: HOSPADM

## 2021-12-17 RX ORDER — ACETAMINOPHEN 325 MG/1
650 TABLET ORAL EVERY 6 HOURS PRN
Status: DISCONTINUED | OUTPATIENT
Start: 2021-12-17 | End: 2021-12-19 | Stop reason: HOSPADM

## 2021-12-17 RX ADMIN — LORAZEPAM 3 MG: 1 TABLET ORAL at 20:21

## 2021-12-17 RX ADMIN — ACETAMINOPHEN 650 MG: 325 TABLET, FILM COATED ORAL at 16:58

## 2021-12-17 RX ADMIN — THIAMINE HYDROCHLORIDE: 100 INJECTION, SOLUTION INTRAMUSCULAR; INTRAVENOUS at 13:26

## 2021-12-17 RX ADMIN — LORAZEPAM 1.5 MG: 2 INJECTION INTRAMUSCULAR; INTRAVENOUS at 16:57

## 2021-12-17 RX ADMIN — LORAZEPAM 2 MG: 2 INJECTION INTRAMUSCULAR; INTRAVENOUS at 13:12

## 2021-12-17 RX ADMIN — LORAZEPAM 2 MG: 2 INJECTION INTRAMUSCULAR; INTRAVENOUS at 14:39

## 2021-12-17 RX ADMIN — ONDANSETRON 4 MG: 2 INJECTION INTRAMUSCULAR; INTRAVENOUS at 13:12

## 2021-12-17 RX ADMIN — LORAZEPAM 3 MG: 1 TABLET ORAL at 17:58

## 2021-12-17 RX ADMIN — SODIUM CHLORIDE: 9 INJECTION, SOLUTION INTRAVENOUS at 20:14

## 2021-12-17 ASSESSMENT — LIFESTYLE VARIABLES
HEADACHE, FULLNESS IN HEAD: MODERATE
NAUSEA AND VOMITING: *
AGITATION: SOMEWHAT MORE THAN NORMAL ACTIVITY
TOTAL SCORE: 22
NAUSEA AND VOMITING: *
TOTAL SCORE: 16
HEADACHE, FULLNESS IN HEAD: MODERATE
PAROXYSMAL SWEATS: BARELY PERCEPTIBLE SWEATING. PALMS MOIST
TOTAL SCORE: 18
AVERAGE NUMBER OF DAYS PER WEEK YOU HAVE A DRINK CONTAINING ALCOHOL: 4
ANXIETY: NO ANXIETY (AT EASE)
TOTAL SCORE: 18
ORIENTATION AND CLOUDING OF SENSORIUM: ORIENTED AND CAN DO SERIAL ADDITIONS
ANXIETY: *
TOTAL SCORE: 4
VISUAL DISTURBANCES: MILD SENSITIVITY
AUDITORY DISTURBANCES: NOT PRESENT
ORIENTATION AND CLOUDING OF SENSORIUM: ORIENTED AND CAN DO SERIAL ADDITIONS
ORIENTATION AND CLOUDING OF SENSORIUM: ORIENTED AND CAN DO SERIAL ADDITIONS
TOTAL SCORE: 4
DO YOU DRINK ALCOHOL: YES
ON A TYPICAL DAY WHEN YOU DRINK ALCOHOL HOW MANY DRINKS DO YOU HAVE: 2
ORIENTATION AND CLOUDING OF SENSORIUM: ORIENTED AND CAN DO SERIAL ADDITIONS
TOTAL SCORE: VERY MILD ITCHING, PINS AND NEEDLES SENSATION, BURNING OR NUMBNESS
TREMOR: TREMOR NOT VISIBLE BUT CAN BE FELT, FINGERTIP TO FINGERTIP
TREMOR: *
NAUSEA AND VOMITING: *
HEADACHE, FULLNESS IN HEAD: MODERATE
ANXIETY: *
ANXIETY: MILDLY ANXIOUS
VISUAL DISTURBANCES: MILD SENSITIVITY
TOTAL SCORE: MODERATE ITCHING, PINS AND NEEDLES SENSATION, BURNING OR NUMBNESS
TOTAL SCORE: MILD ITCHING, PINS AND NEEDLES SENSATION, BURNING OR NUMBNESS
NAUSEA AND VOMITING: *
TOTAL SCORE: 4
ORIENTATION AND CLOUDING OF SENSORIUM: ORIENTED AND CAN DO SERIAL ADDITIONS
TREMOR: *
TREMOR: *
AGITATION: SOMEWHAT MORE THAN NORMAL ACTIVITY
AUDITORY DISTURBANCES: MODERATE HARSHNESS OR ABILITY TO FRIGHTEN
AGITATION: *
VISUAL DISTURBANCES: MILD SENSITIVITY
NAUSEA AND VOMITING: *
VISUAL DISTURBANCES: MILD SENSITIVITY
ORIENTATION AND CLOUDING OF SENSORIUM: ORIENTED AND CAN DO SERIAL ADDITIONS
AGITATION: *
EVER FELT BAD OR GUILTY ABOUT YOUR DRINKING: YES
AGITATION: *
TOTAL SCORE: 16
ANXIETY: *
AGITATION: SOMEWHAT MORE THAN NORMAL ACTIVITY
TOTAL SCORE: MODERATE ITCHING, PINS AND NEEDLES SENSATION, BURNING OR NUMBNESS
PAROXYSMAL SWEATS: BARELY PERCEPTIBLE SWEATING. PALMS MOIST
ANXIETY: MILDLY ANXIOUS
HOW MANY TIMES IN THE PAST YEAR HAVE YOU HAD 5 OR MORE DRINKS IN A DAY: 2
HEADACHE, FULLNESS IN HEAD: MILD
AUDITORY DISTURBANCES: VERY MILD HARSHNESS OR ABILITY TO FRIGHTEN
EVER HAD A DRINK FIRST THING IN THE MORNING TO STEADY YOUR NERVES TO GET RID OF A HANGOVER: YES
HEADACHE, FULLNESS IN HEAD: MODERATE
AUDITORY DISTURBANCES: MODERATE HARSHNESS OR ABILITY TO FRIGHTEN
HAVE PEOPLE ANNOYED YOU BY CRITICIZING YOUR DRINKING: YES
VISUAL DISTURBANCES: MILD SENSITIVITY
ANXIETY: *
PAROXYSMAL SWEATS: *
NAUSEA AND VOMITING: *
HAVE YOU EVER FELT YOU SHOULD CUT DOWN ON YOUR DRINKING: YES
AUDITORY DISTURBANCES: MODERATE HARSHNESS OR ABILITY TO FRIGHTEN
AUDITORY DISTURBANCES: MODERATE HARSHNESS OR ABILITY TO FRIGHTEN
AUDITORY DISTURBANCES: NOT PRESENT
TREMOR: *
TREMOR: TREMOR NOT VISIBLE BUT CAN BE FELT, FINGERTIP TO FINGERTIP
TOTAL SCORE: 17
TOTAL SCORE: MODERATE ITCHING, PINS AND NEEDLES SENSATION, BURNING OR NUMBNESS
PAROXYSMAL SWEATS: *
HEADACHE, FULLNESS IN HEAD: MILD
PAROXYSMAL SWEATS: BARELY PERCEPTIBLE SWEATING. PALMS MOIST
HEADACHE, FULLNESS IN HEAD: MILD
ORIENTATION AND CLOUDING OF SENSORIUM: CANNOT DO SERIAL ADDITIONS OR IS UNCERTAIN ABOUT DATE
TOTAL SCORE: 15
TREMOR: *
PAROXYSMAL SWEATS: *
CONSUMPTION TOTAL: POSITIVE
VISUAL DISTURBANCES: MILD SENSITIVITY
PAROXYSMAL SWEATS: BARELY PERCEPTIBLE SWEATING. PALMS MOIST
VISUAL DISTURBANCES: MILD SENSITIVITY
AGITATION: *
NAUSEA AND VOMITING: *

## 2021-12-17 ASSESSMENT — FIBROSIS 4 INDEX: FIB4 SCORE: 2.07

## 2021-12-17 ASSESSMENT — ENCOUNTER SYMPTOMS
SINUS PAIN: 0
PALPITATIONS: 0
CONSTIPATION: 0
EYE REDNESS: 0
BLOOD IN STOOL: 0
CHILLS: 0
STRIDOR: 0
FOCAL WEAKNESS: 0
MYALGIAS: 0
SORE THROAT: 0
NECK PAIN: 0
BLURRED VISION: 0
WHEEZING: 0
FEVER: 0
SHORTNESS OF BREATH: 0
BACK PAIN: 0
SPUTUM PRODUCTION: 0
WEIGHT LOSS: 0
ORTHOPNEA: 0
CLAUDICATION: 0
ABDOMINAL PAIN: 0
COUGH: 0
EYE DISCHARGE: 0
SEIZURES: 0
VOMITING: 0
NAUSEA: 0
HEARTBURN: 0
DIZZINESS: 0
HALLUCINATIONS: 1
EYE PAIN: 0
HEADACHES: 0
DIARRHEA: 0

## 2021-12-17 NOTE — ED NOTES
Pt ambulated to bathroom stand by assist. Provided water and blankets. Continuous monitoring in place.

## 2021-12-17 NOTE — ASSESSMENT & PLAN NOTE
CIWA protocol   Monitor electrolytes and replace accordingly, particularly magnesium, potassium and phosphorus  Fall, seizure, aspiration precautions.  Thiamine folic acid and multivitamin.

## 2021-12-17 NOTE — ED PROVIDER NOTES
"ED Provider Note    Scribed for Lacey Dahl M.D. by Maninder Moy. 12/17/2021  12:53 PM    Primary care provider: Mohsen Tamasaby, M.D.  Means of arrival: Walk-In  History obtained from: Patient  History limited by: None    CHIEF COMPLAINT  Chief Complaint   Patient presents with   • ETOH Withdrawal     Last drink 3am today, pt states typical amount 1 pint liquor / day, pt states sober but started drinking again 2 weeks ago. Denies seizure hx. Pt has slight tremor, itching, visual hallucinations, nausea, anxiety, and HA. Symptoms are all mild at triage.      HPI  Trevon Brennan is a 51 y.o. female who presents for evaluation of alcohol withdrawal onset prior to arrival. She states her last drink was at 3:00 AM today. She states she usually drinks at least a pint per day. She reports has been through alcohol withdrawal before 2 months ago where she experienced hallucinations and was hospitalized for a couple of days, but did not experience seizures. She states she started drinking 2 years ago when her mother got sick and passed away in June of this year. She states she \"never wants to do this again\" but is unsure of where to go for help. She adds her longest period of sobriety in the last 2 years was for 3 weeks. She admits to associated symptoms of hallucinations (spiders/dark shapes crawling around), nausea, vomiting, diarrhea, and abdominal pain but denies tarry stools, hematemesis or being sick with COVID-like symptoms. No alleviating factors were reported. She denies history of seizures or pancreatitis. She reports she had her first Pfizer COVID vaccination, but was unable to get her second dose.     PPE Note: I personally donned full PPE for all patient encounters during this visit, with an N95 respirator mask, gloves, gown, and goggles.     Scribe remained outside the patient's room and did not have any contact with the patient for the duration of patient encounter.     REVIEW OF SYSTEMS  Pertinent " positives include alcohol withdrawal, visual hallucinations, nausea, vomiting, diarrhea, and abdominal pain.   Pertinent negatives include no tarry stools, hematemesis, or COVID-like symptoms.    See HPI for further details. All other systems are negative.    PAST MEDICAL HISTORY   has a past medical history of Bowel habit changes, Depression (2016), Heart burn, Indigestion, migraines, and Urinary incontinence.    FAMILY HISTORY  None noted.     SOCIAL HISTORY  Social History     Tobacco Use   • Smoking status: Never Smoker   • Smokeless tobacco: Never Used   Substance Use Topics   • Alcohol use: Yes   • Drug use: No      Social History     Substance and Sexual Activity   Drug Use No       SURGICAL HISTORY  Past Surgical History:   Procedure Laterality Date   • VAGINAL HYSTERECTOMY SCOPE TOTAL  1/3/2017    Procedure: VAGINAL HYSTERECTOMY SCOPE TOTAL W/BILATERAL SALPINGECTOMY;  Surgeon: Roel Sears M.D.;  Location: SURGERY SAME DAY [facility] ORS;  Service:    • ANTERIOR AND POSTERIOR REPAIR  1/3/2017    Procedure: ANTERIOR AND POSTERIOR REPAIR;  Surgeon: Roel Sears M.D.;  Location: SURGERY SAME DAY [facility] ORS;  Service:    • ENTEROCELE REPAIR  1/3/2017    Procedure: ENTEROCELE REPAIR With PERINEOPLASTY;  Surgeon: Roel Sears M.D.;  Location: SURGERY SAME DAY [facility] ORS;  Service:    • BLADDER SLING FEMALE  1/3/2017    Procedure: BLADDER SLING FEMALE - TOT;  Surgeon: Roel Sears M.D.;  Location: SURGERY SAME DAY [facility] ORS;  Service:    • VAGINAL SUSPENSION  1/3/2017    Procedure: SACROSPINOUS VAULT VAGINAL SUSPENSION ;  Surgeon: Roel Sears M.D.;  Location: SURGERY SAME DAY [facility] ORS;  Service:    • ERCP IN OR  11/18/2012    Performed by Junior Davenport M.D. at SURGERY [facility]S   • GASTROSCOPY-ENDO  12/21/2011    Performed by DELFINO MICHELE at ENDOSCOPY [facility] ORS   • OTHER ABDOMINAL SURGERY      choley   • OTHER ABDOMINAL SURGERY      appy       CURRENT  "MEDICATIONS  Home Medications     Reviewed by Svne Mancilla R.N. (Registered Nurse) on 12/17/21 at 1238  Med List Status: <None>   Medication Last Dose Status   baclofen (LIORESAL) 10 MG Tab  Active   famotidine (PEPCID) 20 MG Tab  Active   ferrous gluconate (FERGON) 324 (38 FE) MG Tab  Active   hydrocodone-acetaminophen (NORCO) 5-325 MG Tab per tablet  Active   ondansetron (ZOFRAN ODT) 4 MG TBDP  Active   oxycodone, immediate release, 10 MG TABS  Active   paroxetine (PAXIL) 40 MG tablet  Active   tramadol (ULTRAM) 50 MG Tab  Active              ALLERGIES  Allergies   Allergen Reactions   • Erythromycin Hives     Hives for a week     PHYSICAL EXAM  VITAL SIGNS: BP (!) 183/126   Pulse (!) 115   Temp 36.3 °C (97.4 °F) (Temporal)   Resp 16   Ht 1.575 m (5' 2\")   Wt 70.2 kg (154 lb 12.2 oz)   LMP 03/01/2009   SpO2 98%   BMI 28.31 kg/m²      Constitutional: Well developed, well nourished; Non-toxic appearance. Anxious, tremulous, endorsing active visual hallucinations.  HENT: Normocephalic, atraumatic; Bilateral external ears normal; Oropharyngeal exam deferred to COVID-19 outbreak and lack of oropharyngeal complaints.   Eyes: PERRL, EOMI, Conjunctiva normal. No discharge.   Neck:  Supple, nontender midline; No stridor; No nuchal rigidity.   Lymphatic: No cervical lymphadenopathy noted.   Cardiovascular: Tachycardic but regular rhythm without murmurs, rubs, or gallop.   Thorax & Lungs: No respiratory distress, breath sounds clear to auscultation bilaterally without wheezing, rales or rhonchi. Nontender chest wall. No crepitus or subcutaneous air  Abdomen: Soft, bowel sounds normal. No obvious masses; No pulsatile masses; no rebound, guarding, or peritoneal signs. Tender in upper abdomen.  Skin: Good color; warm and dry without rash or petechia.  Back: Nontender, No CVA tenderness.   Extremities: Distal radial, dorsalis pedis, posterior tibial pulses are equal bilaterally; No edema; Nontender calves or " saphenous, No cyanosis, No clubbing.   Musculoskeletal: Good range of motion in all major joints. No tenderness to palpation or major deformities noted.   Neurologic: Alert & oriented x 4, clear speech    EKG  12 Lead EKG interpreted by me as below.     LABS/RADIOLOGY/PROCEDURES  Results for orders placed or performed during the hospital encounter of 12/17/21   CBC WITH DIFFERENTIAL   Result Value Ref Range    WBC 7.7 4.8 - 10.8 K/uL    RBC 5.26 4.20 - 5.40 M/uL    Hemoglobin 15.4 12.0 - 16.0 g/dL    Hematocrit 44.6 37.0 - 47.0 %    MCV 84.8 81.4 - 97.8 fL    MCH 29.3 27.0 - 33.0 pg    MCHC 34.5 33.6 - 35.0 g/dL    RDW 42.5 35.9 - 50.0 fL    Platelet Count 228 164 - 446 K/uL    MPV 9.3 9.0 - 12.9 fL    Neutrophils-Polys 76.70 (H) 44.00 - 72.00 %    Lymphocytes 14.50 (L) 22.00 - 41.00 %    Monocytes 7.00 0.00 - 13.40 %    Eosinophils 0.80 0.00 - 6.90 %    Basophils 0.60 0.00 - 1.80 %    Immature Granulocytes 0.40 0.00 - 0.90 %    Nucleated RBC 0.00 /100 WBC    Neutrophils (Absolute) 5.91 2.00 - 7.15 K/uL    Lymphs (Absolute) 1.12 1.00 - 4.80 K/uL    Monos (Absolute) 0.54 0.00 - 0.85 K/uL    Eos (Absolute) 0.06 0.00 - 0.51 K/uL    Baso (Absolute) 0.05 0.00 - 0.12 K/uL    Immature Granulocytes (abs) 0.03 0.00 - 0.11 K/uL    NRBC (Absolute) 0.00 K/uL   COMP METABOLIC PANEL   Result Value Ref Range    Sodium 138 135 - 145 mmol/L    Potassium 3.4 (L) 3.6 - 5.5 mmol/L    Chloride 97 96 - 112 mmol/L    Co2 20 20 - 33 mmol/L    Anion Gap 21.0 (H) 7.0 - 16.0    Glucose 120 (H) 65 - 99 mg/dL    Bun 9 8 - 22 mg/dL    Creatinine 0.54 0.50 - 1.40 mg/dL    Calcium 9.5 8.5 - 10.5 mg/dL    AST(SGOT) 61 (H) 12 - 45 U/L    ALT(SGPT) 47 2 - 50 U/L    Alkaline Phosphatase 148 (H) 30 - 99 U/L    Total Bilirubin 1.2 0.1 - 1.5 mg/dL    Albumin 5.1 (H) 3.2 - 4.9 g/dL    Total Protein 8.0 6.0 - 8.2 g/dL    Globulin 2.9 1.9 - 3.5 g/dL    A-G Ratio 1.8 g/dL   LIPASE   Result Value Ref Range    Lipase 28 11 - 82 U/L   DIAGNOSTIC ALCOHOL    Result Value Ref Range    Diagnostic Alcohol <10.1 0.0 - 10.0 mg/dL   ESTIMATED GFR   Result Value Ref Range    GFR If African American >60 >60 mL/min/1.73 m 2    GFR If Non African American >60 >60 mL/min/1.73 m 2   EKG (NOW)   Result Value Ref Range    Report       [facility] Emergency Dept[facility]    Test Date:  2021  Pt Name:    GATO QUINTANA                 Department: ER  MRN:        [id number]                      Room:       [facility]  Gender:     Female                       Technician: [id number]  :        [id number]                   Requested By:BRIA DAHL  Order #:    [id number]                    Reading MD: Bria Dahl    Measurements  Intervals                                Axis  Rate:       97                           P:          27  CA:         170                          QRS:        -61  QRSD:       83                           T:          48  QT:         353  QTc:        449    Interpretive Statements  Sinus rhythm rate of 97  Left axis deviation  Normal intervals  T waves flattened inferiorly and in V2  No ST elevation or depression  Probable left atrial enlargement  Left anterior fascicular block  RSR' in V1 or V2, right VCD or RVH  Left ventricular hypertrophy  No previous ECG available for comparison  Electronica lly Signed On 2021 15:09:47 PST by Bria Dahl       All labs reviewed by me.    COURSE & MEDICAL DECISION MAKING  Pertinent Labs & Imaging studies reviewed. (See chart for details)    12:53 PM - Patient seen and examined at bedside. Discussed plan of care, including EKG and labs. Patient agrees to the plan of care. The patient will be medicated with Zofran 4 mg injection, detox IV 1000 mL infusion, Ativan 2 mg injection, Theragran tablet daily, Folvite 1 mg tablet daily, and thiamine 100 mg tablet daily.    2:26 PM - Paged Hospitalist.    2:34 PM I discussed the patient's case and the above findings with Dr. Hall (Hospitalist) who will assess the patient for  "hospitalization.     Patient presents to the ER for alcohol withdrawal symptoms. She currently is drinking about a pint of liquor a day. She wants to stop drinking. She stopped drinking early this morning at 3 AM.  Alcohol level is less than 10 at this time.  She arrives hypertensive, tachycardic and very tremulous.  She reports \"seeing spiders\".  These visual hallucinations are similar to the visual hallucinations that she had in the past with her alcohol withdrawal.  She is never had seizures.  She is never been admitted to the ICU for alcohol withdrawal, but was hospitalized for alcohol withdrawal with delirium several months ago at [facility].  Anion gap is slightly high at 21.  Suspect this is secondary to alcoholic ketoacidosis.  She was given several milligrams of Ativan initially upon arrival.  She remained tachycardic so she was then given 2 more.  She was given rally bag.  She describes vomiting in association with her withdrawal.  She was given some IV Zofran and is feeling better.  Lipase is normal.  There is no evidence of pancreatitis.  Otherwise, patient's electrolytes do not look too bad.  Potassium is a little bit low at 3.4.  Given her persistent hypertension and tachycardia and tremulousness she will be hospitalized for alcohol withdrawal.  I spoke with Dr. Hall, hospitalist on-call, and you can evaluate the patient hospitalization.    DISPOSITION:  Patient will be hospitalized by Dr. Hall in guarded condition.    FINAL IMPRESSION  1. Alcohol withdrawal syndrome, with delirium (HCC) Acute   2. Acute alcoholic gastritis without hemorrhage Acute      Maninder FORBES (Yuliya), am scribing for, and in the presence of, Lacey Dahl M.D..    Electronically signed by: Maninder Moy (Yuliya), 12/17/2021    Lacey FORBES M.D. personally performed the services described in this documentation, as scribed by Maninder Moy in my presence, and it is both accurate and " complete.    This dictation has been created using voice recognition software. The accuracy of the dictation is limited by the abilities of the software. I expect there may be some errors of grammar and possibly content. I made every attempt to manually correct the errors within my dictation. However, errors related to voice recognition software may still exist and should be interpreted within the appropriate context.    The note accurately reflects work and decisions made by me.  Lacey Dahl M.D.  12/17/2021  3:08 PM

## 2021-12-17 NOTE — ED NOTES
Med rec completed per Pt at bedside.  Allergies reviewed with Pt.  Pt denies taking any prescription medications.  Pt unsure of the strength of Mucinex she uses.  No oral antibiotics in last 30 days.  Pt's preferred pharmacy: [facility].

## 2021-12-17 NOTE — H&P
Hospital Medicine History & Physical Note    Date of Service  12/17/2021    Primary Care Physician  Mohsen Tamasaby, M.D.    Consultants  none    Code Status  Full Code    Chief Complaint  Chief Complaint   Patient presents with   • ETOH Withdrawal     Last drink 3am today, pt states typical amount 1 pint liquor / day, pt states sober but started drinking again 2 weeks ago. Denies seizure hx. Pt has slight tremor, itching, visual hallucinations, nausea, anxiety, and HA. Symptoms are all mild at triage.        History of Presenting Illness  Trevon Brennan is a 51 y.o. female with past medical history of chronic alcoholic who presented 12/17/2021 with visual and auditory hallucination started earlier today.  She stated that she drink 1 pint of hard liquor a day and her last drink was yesterday.  She has history of alcohol withdrawal symptoms before but never had a seizure associated with it.  He stated that she see someone sitting by her side and mumbling but she could not hear what she was saying and also complained about auditory hallucination.  She will be related to alcohol withdrawal symptoms..    I discussed the plan of care with patient and bedside RN.    Review of Systems  Review of Systems   Constitutional: Negative for chills, fever and weight loss.   HENT: Negative for congestion, hearing loss, nosebleeds, sinus pain and sore throat.    Eyes: Negative for blurred vision, pain, discharge and redness.   Respiratory: Negative for cough, sputum production, shortness of breath, wheezing and stridor.    Cardiovascular: Negative for chest pain, palpitations, orthopnea and claudication.   Gastrointestinal: Negative for abdominal pain, blood in stool, constipation, diarrhea, heartburn, nausea and vomiting.   Genitourinary: Negative for dysuria, frequency, hematuria and urgency.   Musculoskeletal: Negative for back pain, myalgias and neck pain.   Skin: Negative for itching and rash.   Neurological: Negative for  dizziness, focal weakness, seizures and headaches.   Psychiatric/Behavioral: Positive for hallucinations.       Past Medical History   has a past medical history of Bowel habit changes, Depression (2016), Heart burn, Indigestion, migraines, and Urinary incontinence.    Surgical History   has a past surgical history that includes other abdominal surgery; other abdominal surgery; gastroscopy-endo (12/21/2011); ercp in or (11/18/2012); vaginal hysterectomy scope total (1/3/2017); anterior and posterior repair (1/3/2017); enterocele repair (1/3/2017); bladder sling female (1/3/2017); and vaginal suspension (1/3/2017).     Family History  Reviewed and no pertinent family history      Social History   reports that she has never smoked. She has never used smokeless tobacco. She reports that she does not drink alcohol and does not use drugs.    Allergies  Allergies   Allergen Reactions   • Erythromycin Hives     Hives for a week       Medications  Prior to Admission Medications   Prescriptions Last Dose Informant Patient Reported? Taking?   baclofen (LIORESAL) 10 MG Tab   Yes No   Sig: Take 10 mg by mouth every day.   famotidine (PEPCID) 20 MG Tab   Yes No   Sig: Take 20 mg by mouth as needed.   ferrous gluconate (FERGON) 324 (38 FE) MG Tab   Yes No   Sig: Take 324 mg by mouth 2 times a day, with meals.   hydrocodone-acetaminophen (NORCO) 5-325 MG Tab per tablet   Yes No   Sig: Take 1-2 Tabs by mouth every four hours as needed.   ondansetron (ZOFRAN ODT) 4 MG TBDP   No No   Sig: Take 1 Tab by mouth 4 times a day as needed for Nausea/Vomiting.   Patient not taking: Reported on 9/27/2019   oxycodone, immediate release, 10 MG TABS   No No   Sig: Take 1 Tab by mouth every four hours as needed ((4-6)).   Patient not taking: Reported on 9/27/2019   paroxetine (PAXIL) 40 MG tablet   Yes No   Sig: Take 40 mg by mouth every day.   tramadol (ULTRAM) 50 MG Tab   Yes No   Sig: Take 50 mg by mouth every 6 hours as needed.       Facility-Administered Medications: None       Physical Exam  Temp:  [36.3 °C (97.4 °F)] 36.3 °C (97.4 °F)  Pulse:  [110-115] 110  Resp:  [16-17] 17  BP: (167-183)/() 167/97  SpO2:  [98 %-99 %] 99 %  Blood Pressure: (!) 167/97   Temperature: 36.3 °C (97.4 °F)   Pulse: (!) 110   Respiration: 17   Pulse Oximetry: 99 %       Physical Exam  Vitals reviewed.   Constitutional:       General: She is not in acute distress.     Appearance: Normal appearance. She is normal weight. She is not ill-appearing, toxic-appearing or diaphoretic.   HENT:      Head: Normocephalic and atraumatic.      Right Ear: Tympanic membrane, ear canal and external ear normal.      Left Ear: Tympanic membrane, ear canal and external ear normal.      Nose: No congestion or rhinorrhea.   Eyes:      Extraocular Movements: Extraocular movements intact.      Conjunctiva/sclera: Conjunctivae normal.      Pupils: Pupils are equal, round, and reactive to light.   Cardiovascular:      Rate and Rhythm: Normal rate and regular rhythm.      Pulses: Normal pulses.      Heart sounds: Normal heart sounds. No murmur heard.  No friction rub. No gallop.    Pulmonary:      Effort: Pulmonary effort is normal. No respiratory distress.      Breath sounds: Normal breath sounds. No stridor. No wheezing or rhonchi.   Abdominal:      General: Bowel sounds are normal. There is no distension.      Palpations: Abdomen is soft. There is no mass.      Tenderness: There is no abdominal tenderness. There is no guarding or rebound.      Hernia: No hernia is present.   Musculoskeletal:         General: No swelling or tenderness.      Cervical back: Normal range of motion and neck supple.   Skin:     General: Skin is warm.      Findings: No erythema.   Neurological:      General: No focal deficit present.      Mental Status: She is alert.         Laboratory:  Recent Labs     12/17/21  1327   WBC 7.7   RBC 5.26   HEMOGLOBIN 15.4   HEMATOCRIT 44.6   MCV 84.8   MCH 29.3   MCHC  34.5   RDW 42.5   PLATELETCT 228   MPV 9.3     Recent Labs     12/17/21  1327   SODIUM 138   POTASSIUM 3.4*   CHLORIDE 97   CO2 20   GLUCOSE 120*   BUN 9   CREATININE 0.54   CALCIUM 9.5     Recent Labs     12/17/21  1327   ALTSGPT 47   ASTSGOT 61*   ALKPHOSPHAT 148*   TBILIRUBIN 1.2   LIPASE 28   GLUCOSE 120*         No results for input(s): NTPROBNP in the last 72 hours.      No results for input(s): TROPONINT in the last 72 hours.    Imaging:  No orders to display       no X-Ray or EKG requiring interpretation    Assessment/Plan:  I anticipate this patient will require at least two midnights for appropriate medical management, necessitating inpatient admission.    * Alcohol withdrawal delirium (HCC)- (present on admission)  Assessment & Plan    [facility] protocol  Aspiration, fall, seizure precaution  Cessation education  Vitamin supplements    Hypokalemia  Assessment & Plan  Replete as needed      VTE prophylaxis: enoxaparin ppx

## 2021-12-17 NOTE — ED TRIAGE NOTES
"Chief Complaint   Patient presents with   • ETOH Withdrawal     Last drink 3am today, pt states typical amount 1 pint liquor / day, pt states sober but started drinking again 2 weeks ago. Denies seizure hx. Pt has slight tremor, itching, visual hallucinations, nausea, anxiety, and HA. Symptoms are all mild at triage.      Pt ambulatory to triage for above complaints, VSS on RA, GCS 15, NAD. Pt states daily ETOH X 2 years.    Denies all s/sx of covid, denies recent travel, denies fevers.    Charge RN notified of pt.     Pt returned to [facility]. Educated on triage process and to inform staff of any changes.     BP (!) 183/126   Pulse (!) 115   Temp 36.3 °C (97.4 °F) (Temporal)   Resp 16   Ht 1.575 m (5' 2\")   Wt 70.2 kg (154 lb 12.2 oz)   LMP 03/01/2009   SpO2 98%   BMI 28.31 kg/m²     "

## 2021-12-17 NOTE — ED NOTES
Called floor to give report, notified that bed is not clean as of yet. Will be notified when room is clean on tele 7.

## 2021-12-18 LAB
ALBUMIN SERPL BCP-MCNC: 4 G/DL (ref 3.2–4.9)
ALBUMIN/GLOB SERPL: 1.8 G/DL
ALP SERPL-CCNC: 116 U/L (ref 30–99)
ALT SERPL-CCNC: 34 U/L (ref 2–50)
ANION GAP SERPL CALC-SCNC: 11 MMOL/L (ref 7–16)
ANION GAP SERPL CALC-SCNC: 11 MMOL/L (ref 7–16)
ANION GAP SERPL CALC-SCNC: 13 MMOL/L (ref 7–16)
AST SERPL-CCNC: 40 U/L (ref 12–45)
BILIRUB SERPL-MCNC: 0.8 MG/DL (ref 0.1–1.5)
BUN SERPL-MCNC: 11 MG/DL (ref 8–22)
BUN SERPL-MCNC: 8 MG/DL (ref 8–22)
BUN SERPL-MCNC: 9 MG/DL (ref 8–22)
CALCIUM SERPL-MCNC: 9.1 MG/DL (ref 8.5–10.5)
CALCIUM SERPL-MCNC: 9.1 MG/DL (ref 8.5–10.5)
CALCIUM SERPL-MCNC: 9.3 MG/DL (ref 8.5–10.5)
CHLORIDE SERPL-SCNC: 104 MMOL/L (ref 96–112)
CHLORIDE SERPL-SCNC: 104 MMOL/L (ref 96–112)
CHLORIDE SERPL-SCNC: 99 MMOL/L (ref 96–112)
CO2 SERPL-SCNC: 23 MMOL/L (ref 20–33)
CO2 SERPL-SCNC: 24 MMOL/L (ref 20–33)
CO2 SERPL-SCNC: 26 MMOL/L (ref 20–33)
CREAT SERPL-MCNC: 0.53 MG/DL (ref 0.5–1.4)
CREAT SERPL-MCNC: 0.63 MG/DL (ref 0.5–1.4)
CREAT SERPL-MCNC: 0.68 MG/DL (ref 0.5–1.4)
ERYTHROCYTE [DISTWIDTH] IN BLOOD BY AUTOMATED COUNT: 43.6 FL (ref 35.9–50)
GLOBULIN SER CALC-MCNC: 2.2 G/DL (ref 1.9–3.5)
GLUCOSE SERPL-MCNC: 103 MG/DL (ref 65–99)
GLUCOSE SERPL-MCNC: 116 MG/DL (ref 65–99)
GLUCOSE SERPL-MCNC: 125 MG/DL (ref 65–99)
HCT VFR BLD AUTO: 39.8 % (ref 37–47)
HGB BLD-MCNC: 13.4 G/DL (ref 12–16)
MAGNESIUM SERPL-MCNC: 2 MG/DL (ref 1.5–2.5)
MCH RBC QN AUTO: 29.1 PG (ref 27–33)
MCHC RBC AUTO-ENTMCNC: 33.7 G/DL (ref 33.6–35)
MCV RBC AUTO: 86.3 FL (ref 81.4–97.8)
PLATELET # BLD AUTO: 186 K/UL (ref 164–446)
PMV BLD AUTO: 9.7 FL (ref 9–12.9)
POTASSIUM SERPL-SCNC: 2.7 MMOL/L (ref 3.6–5.5)
POTASSIUM SERPL-SCNC: 3.8 MMOL/L (ref 3.6–5.5)
POTASSIUM SERPL-SCNC: 4.1 MMOL/L (ref 3.6–5.5)
PROT SERPL-MCNC: 6.2 G/DL (ref 6–8.2)
RBC # BLD AUTO: 4.61 M/UL (ref 4.2–5.4)
SODIUM SERPL-SCNC: 138 MMOL/L (ref 135–145)
SODIUM SERPL-SCNC: 138 MMOL/L (ref 135–145)
SODIUM SERPL-SCNC: 139 MMOL/L (ref 135–145)
WBC # BLD AUTO: 4.3 K/UL (ref 4.8–10.8)

## 2021-12-18 PROCEDURE — 700102 HCHG RX REV CODE 250 W/ 637 OVERRIDE(OP): Performed by: STUDENT IN AN ORGANIZED HEALTH CARE EDUCATION/TRAINING PROGRAM

## 2021-12-18 PROCEDURE — 94760 N-INVAS EAR/PLS OXIMETRY 1: CPT

## 2021-12-18 PROCEDURE — 83735 ASSAY OF MAGNESIUM: CPT

## 2021-12-18 PROCEDURE — 700111 HCHG RX REV CODE 636 W/ 250 OVERRIDE (IP): Performed by: INTERNAL MEDICINE

## 2021-12-18 PROCEDURE — 36415 COLL VENOUS BLD VENIPUNCTURE: CPT

## 2021-12-18 PROCEDURE — A9270 NON-COVERED ITEM OR SERVICE: HCPCS | Performed by: INTERNAL MEDICINE

## 2021-12-18 PROCEDURE — 700102 HCHG RX REV CODE 250 W/ 637 OVERRIDE(OP): Performed by: INTERNAL MEDICINE

## 2021-12-18 PROCEDURE — 700111 HCHG RX REV CODE 636 W/ 250 OVERRIDE (IP): Performed by: HOSPITALIST

## 2021-12-18 PROCEDURE — 99232 SBSQ HOSP IP/OBS MODERATE 35: CPT | Performed by: HOSPITALIST

## 2021-12-18 PROCEDURE — A9270 NON-COVERED ITEM OR SERVICE: HCPCS | Performed by: STUDENT IN AN ORGANIZED HEALTH CARE EDUCATION/TRAINING PROGRAM

## 2021-12-18 PROCEDURE — 80048 BASIC METABOLIC PNL TOTAL CA: CPT

## 2021-12-18 PROCEDURE — 85027 COMPLETE CBC AUTOMATED: CPT

## 2021-12-18 PROCEDURE — 700105 HCHG RX REV CODE 258: Performed by: INTERNAL MEDICINE

## 2021-12-18 PROCEDURE — 80053 COMPREHEN METABOLIC PANEL: CPT

## 2021-12-18 PROCEDURE — 770020 HCHG ROOM/CARE - TELE (206)

## 2021-12-18 RX ORDER — POTASSIUM CHLORIDE 7.45 MG/ML
10 INJECTION INTRAVENOUS
Status: COMPLETED | OUTPATIENT
Start: 2021-12-18 | End: 2021-12-18

## 2021-12-18 RX ORDER — POTASSIUM CHLORIDE 20 MEQ/1
40 TABLET, EXTENDED RELEASE ORAL EVERY 4 HOURS
Status: COMPLETED | OUTPATIENT
Start: 2021-12-18 | End: 2021-12-18

## 2021-12-18 RX ADMIN — LORAZEPAM 2 MG: 2 TABLET ORAL at 07:20

## 2021-12-18 RX ADMIN — Medication 1 MG: at 05:12

## 2021-12-18 RX ADMIN — LORAZEPAM 3 MG: 1 TABLET ORAL at 23:42

## 2021-12-18 RX ADMIN — ACETAMINOPHEN 650 MG: 325 TABLET, FILM COATED ORAL at 20:26

## 2021-12-18 RX ADMIN — LORAZEPAM 3 MG: 1 TABLET ORAL at 09:43

## 2021-12-18 RX ADMIN — LORAZEPAM 2 MG: 2 TABLET ORAL at 18:09

## 2021-12-18 RX ADMIN — POTASSIUM CHLORIDE 10 MEQ: 7.46 INJECTION, SOLUTION INTRAVENOUS at 09:43

## 2021-12-18 RX ADMIN — ACETAMINOPHEN 650 MG: 325 TABLET, FILM COATED ORAL at 08:06

## 2021-12-18 RX ADMIN — POTASSIUM CHLORIDE 40 MEQ: 1500 TABLET, EXTENDED RELEASE ORAL at 05:12

## 2021-12-18 RX ADMIN — LORAZEPAM 2 MG: 2 TABLET ORAL at 21:37

## 2021-12-18 RX ADMIN — LORAZEPAM 2 MG: 2 TABLET ORAL at 05:12

## 2021-12-18 RX ADMIN — SODIUM CHLORIDE: 9 INJECTION, SOLUTION INTRAVENOUS at 11:08

## 2021-12-18 RX ADMIN — POTASSIUM CHLORIDE 40 MEQ: 1500 TABLET, EXTENDED RELEASE ORAL at 09:43

## 2021-12-18 RX ADMIN — LORAZEPAM 3 MG: 1 TABLET ORAL at 16:09

## 2021-12-18 RX ADMIN — POTASSIUM CHLORIDE 10 MEQ: 7.46 INJECTION, SOLUTION INTRAVENOUS at 08:27

## 2021-12-18 RX ADMIN — LORAZEPAM 2 MG: 2 TABLET ORAL at 00:45

## 2021-12-18 RX ADMIN — Medication 100 MG: at 05:12

## 2021-12-18 RX ADMIN — ENOXAPARIN SODIUM 40 MG: 40 INJECTION SUBCUTANEOUS at 05:11

## 2021-12-18 RX ADMIN — POTASSIUM CHLORIDE 10 MEQ: 7.46 INJECTION, SOLUTION INTRAVENOUS at 11:00

## 2021-12-18 RX ADMIN — LORAZEPAM 3 MG: 1 TABLET ORAL at 20:35

## 2021-12-18 RX ADMIN — LORAZEPAM 2 MG: 2 TABLET ORAL at 11:08

## 2021-12-18 RX ADMIN — THERA TABS 1 TABLET: TAB at 05:12

## 2021-12-18 RX ADMIN — LORAZEPAM 2 MG: 2 TABLET ORAL at 14:01

## 2021-12-18 ASSESSMENT — LIFESTYLE VARIABLES
AUDITORY DISTURBANCES: VERY MILD HARSHNESS OR ABILITY TO FRIGHTEN
TOTAL SCORE: 14
ORIENTATION AND CLOUDING OF SENSORIUM: ORIENTED AND CAN DO SERIAL ADDITIONS
ANXIETY: MILDLY ANXIOUS
TOTAL SCORE: 11
ORIENTATION AND CLOUDING OF SENSORIUM: ORIENTED AND CAN DO SERIAL ADDITIONS
TREMOR: TREMOR NOT VISIBLE BUT CAN BE FELT, FINGERTIP TO FINGERTIP
NAUSEA AND VOMITING: MILD NAUSEA WITH NO VOMITING
VISUAL DISTURBANCES: MILD SENSITIVITY
ORIENTATION AND CLOUDING OF SENSORIUM: ORIENTED AND CAN DO SERIAL ADDITIONS
PAROXYSMAL SWEATS: BARELY PERCEPTIBLE SWEATING. PALMS MOIST
ANXIETY: *
ANXIETY: *
ANXIETY: MILDLY ANXIOUS
AUDITORY DISTURBANCES: VERY MILD HARSHNESS OR ABILITY TO FRIGHTEN
VISUAL DISTURBANCES: MILD SENSITIVITY
HEADACHE, FULLNESS IN HEAD: MODERATELY SEVERE
AUDITORY DISTURBANCES: VERY MILD HARSHNESS OR ABILITY TO FRIGHTEN
TREMOR: *
ORIENTATION AND CLOUDING OF SENSORIUM: ORIENTED AND CAN DO SERIAL ADDITIONS
AGITATION: NORMAL ACTIVITY
ORIENTATION AND CLOUDING OF SENSORIUM: ORIENTED AND CAN DO SERIAL ADDITIONS
TOTAL SCORE: MILD ITCHING, PINS AND NEEDLES SENSATION, BURNING OR NUMBNESS
PAROXYSMAL SWEATS: BARELY PERCEPTIBLE SWEATING. PALMS MOIST
VISUAL DISTURBANCES: VERY MILD SENSITIVITY
TOTAL SCORE: 15
TOTAL SCORE: MILD ITCHING, PINS AND NEEDLES SENSATION, BURNING OR NUMBNESS
AGITATION: SOMEWHAT MORE THAN NORMAL ACTIVITY
TOTAL SCORE: 17
TOTAL SCORE: VERY MILD ITCHING, PINS AND NEEDLES SENSATION, BURNING OR NUMBNESS
NAUSEA AND VOMITING: MILD NAUSEA WITH NO VOMITING
ANXIETY: MILDLY ANXIOUS
TOTAL SCORE: 13
AUDITORY DISTURBANCES: NOT PRESENT
VISUAL DISTURBANCES: MILD SENSITIVITY
NAUSEA AND VOMITING: MILD NAUSEA WITH NO VOMITING
AUDITORY DISTURBANCES: VERY MILD HARSHNESS OR ABILITY TO FRIGHTEN
NAUSEA AND VOMITING: MILD NAUSEA WITH NO VOMITING
PAROXYSMAL SWEATS: BARELY PERCEPTIBLE SWEATING. PALMS MOIST
VISUAL DISTURBANCES: VERY MILD SENSITIVITY
NAUSEA AND VOMITING: MILD NAUSEA WITH NO VOMITING
PAROXYSMAL SWEATS: BARELY PERCEPTIBLE SWEATING. PALMS MOIST
AUDITORY DISTURBANCES: VERY MILD HARSHNESS OR ABILITY TO FRIGHTEN
NAUSEA AND VOMITING: MILD NAUSEA WITH NO VOMITING
VISUAL DISTURBANCES: MILD SENSITIVITY
TOTAL SCORE: 12
VISUAL DISTURBANCES: VERY MILD SENSITIVITY
AGITATION: NORMAL ACTIVITY
ORIENTATION AND CLOUDING OF SENSORIUM: ORIENTED AND CAN DO SERIAL ADDITIONS
TOTAL SCORE: VERY MILD ITCHING, PINS AND NEEDLES SENSATION, BURNING OR NUMBNESS
TREMOR: TREMOR NOT VISIBLE BUT CAN BE FELT, FINGERTIP TO FINGERTIP
AGITATION: NORMAL ACTIVITY
NAUSEA AND VOMITING: MILD NAUSEA WITH NO VOMITING
TREMOR: TREMOR NOT VISIBLE BUT CAN BE FELT, FINGERTIP TO FINGERTIP
PAROXYSMAL SWEATS: BARELY PERCEPTIBLE SWEATING. PALMS MOIST
AGITATION: NORMAL ACTIVITY
VISUAL DISTURBANCES: VERY MILD SENSITIVITY
TOTAL SCORE: 13
HEADACHE, FULLNESS IN HEAD: MODERATELY SEVERE
AGITATION: NORMAL ACTIVITY
AUDITORY DISTURBANCES: VERY MILD HARSHNESS OR ABILITY TO FRIGHTEN
ANXIETY: *
NAUSEA AND VOMITING: *
PAROXYSMAL SWEATS: BARELY PERCEPTIBLE SWEATING. PALMS MOIST
ANXIETY: *
AUDITORY DISTURBANCES: VERY MILD HARSHNESS OR ABILITY TO FRIGHTEN
TREMOR: TREMOR NOT VISIBLE BUT CAN BE FELT, FINGERTIP TO FINGERTIP
PAROXYSMAL SWEATS: BARELY PERCEPTIBLE SWEATING. PALMS MOIST
TREMOR: *
ANXIETY: *
AUDITORY DISTURBANCES: VERY MILD HARSHNESS OR ABILITY TO FRIGHTEN
AGITATION: *
AUDITORY DISTURBANCES: VERY MILD HARSHNESS OR ABILITY TO FRIGHTEN
PAROXYSMAL SWEATS: BARELY PERCEPTIBLE SWEATING. PALMS MOIST
ORIENTATION AND CLOUDING OF SENSORIUM: ORIENTED AND CAN DO SERIAL ADDITIONS
AUDITORY DISTURBANCES: VERY MILD HARSHNESS OR ABILITY TO FRIGHTEN
VISUAL DISTURBANCES: VERY MILD SENSITIVITY
TREMOR: *
TREMOR: *
TOTAL SCORE: MILD ITCHING, PINS AND NEEDLES SENSATION, BURNING OR NUMBNESS
AGITATION: SOMEWHAT MORE THAN NORMAL ACTIVITY
ORIENTATION AND CLOUDING OF SENSORIUM: ORIENTED AND CAN DO SERIAL ADDITIONS
PAROXYSMAL SWEATS: NO SWEAT VISIBLE
TOTAL SCORE: 14
NAUSEA AND VOMITING: MILD NAUSEA WITH NO VOMITING
ANXIETY: MODERATELY ANXIOUS OR GUARDED, SO ANXIETY IS INFERRED
ORIENTATION AND CLOUDING OF SENSORIUM: ORIENTED AND CAN DO SERIAL ADDITIONS
HEADACHE, FULLNESS IN HEAD: MODERATE
NAUSEA AND VOMITING: *
VISUAL DISTURBANCES: VERY MILD SENSITIVITY
AGITATION: SOMEWHAT MORE THAN NORMAL ACTIVITY
TOTAL SCORE: MILD ITCHING, PINS AND NEEDLES SENSATION, BURNING OR NUMBNESS
HEADACHE, FULLNESS IN HEAD: MODERATE
ANXIETY: *
HEADACHE, FULLNESS IN HEAD: MODERATELY SEVERE
PAROXYSMAL SWEATS: BARELY PERCEPTIBLE SWEATING. PALMS MOIST
TOTAL SCORE: MILD ITCHING, PINS AND NEEDLES SENSATION, BURNING OR NUMBNESS
TOTAL SCORE: 15
AUDITORY DISTURBANCES: VERY MILD HARSHNESS OR ABILITY TO FRIGHTEN
PAROXYSMAL SWEATS: BARELY PERCEPTIBLE SWEATING. PALMS MOIST
NAUSEA AND VOMITING: MILD NAUSEA WITH NO VOMITING
ANXIETY: MILDLY ANXIOUS
HEADACHE, FULLNESS IN HEAD: MODERATELY SEVERE
ORIENTATION AND CLOUDING OF SENSORIUM: ORIENTED AND CAN DO SERIAL ADDITIONS
TREMOR: TREMOR NOT VISIBLE BUT CAN BE FELT, FINGERTIP TO FINGERTIP
HEADACHE, FULLNESS IN HEAD: MODERATE
AGITATION: SOMEWHAT MORE THAN NORMAL ACTIVITY
TOTAL SCORE: VERY MILD ITCHING, PINS AND NEEDLES SENSATION, BURNING OR NUMBNESS
TOTAL SCORE: VERY MILD ITCHING, PINS AND NEEDLES SENSATION, BURNING OR NUMBNESS
ORIENTATION AND CLOUDING OF SENSORIUM: ORIENTED AND CAN DO SERIAL ADDITIONS
TOTAL SCORE: 14
TOTAL SCORE: 15
AGITATION: NORMAL ACTIVITY
HEADACHE, FULLNESS IN HEAD: MODERATELY SEVERE
VISUAL DISTURBANCES: VERY MILD SENSITIVITY
VISUAL DISTURBANCES: MILD SENSITIVITY
NAUSEA AND VOMITING: MILD NAUSEA WITH NO VOMITING
HEADACHE, FULLNESS IN HEAD: MODERATE
TREMOR: TREMOR NOT VISIBLE BUT CAN BE FELT, FINGERTIP TO FINGERTIP
HEADACHE, FULLNESS IN HEAD: SEVERE
VISUAL DISTURBANCES: VERY MILD SENSITIVITY
TOTAL SCORE: 14
PAROXYSMAL SWEATS: BARELY PERCEPTIBLE SWEATING. PALMS MOIST
ORIENTATION AND CLOUDING OF SENSORIUM: ORIENTED AND CAN DO SERIAL ADDITIONS
AUDITORY DISTURBANCES: VERY MILD HARSHNESS OR ABILITY TO FRIGHTEN
PAROXYSMAL SWEATS: BARELY PERCEPTIBLE SWEATING. PALMS MOIST
AGITATION: NORMAL ACTIVITY
HEADACHE, FULLNESS IN HEAD: MODERATE
TOTAL SCORE: MILD ITCHING, PINS AND NEEDLES SENSATION, BURNING OR NUMBNESS
NAUSEA AND VOMITING: *
TOTAL SCORE: MILD ITCHING, PINS AND NEEDLES SENSATION, BURNING OR NUMBNESS
AGITATION: SOMEWHAT MORE THAN NORMAL ACTIVITY
TOTAL SCORE: MILD ITCHING, PINS AND NEEDLES SENSATION, BURNING OR NUMBNESS
TOTAL SCORE: 12
TREMOR: TREMOR NOT VISIBLE BUT CAN BE FELT, FINGERTIP TO FINGERTIP
HEADACHE, FULLNESS IN HEAD: MILD
ANXIETY: MILDLY ANXIOUS
TOTAL SCORE: MILD ITCHING, PINS AND NEEDLES SENSATION, BURNING OR NUMBNESS
ORIENTATION AND CLOUDING OF SENSORIUM: ORIENTED AND CAN DO SERIAL ADDITIONS
TREMOR: TREMOR NOT VISIBLE BUT CAN BE FELT, FINGERTIP TO FINGERTIP
TREMOR: *
ANXIETY: *
HEADACHE, FULLNESS IN HEAD: MODERATE

## 2021-12-18 ASSESSMENT — ENCOUNTER SYMPTOMS
DIAPHORESIS: 1
STRIDOR: 0
HALLUCINATIONS: 1
EYE REDNESS: 0
PALPITATIONS: 1
COUGH: 0
VOMITING: 0
FOCAL WEAKNESS: 0
EYE DISCHARGE: 0
CHILLS: 0
MYALGIAS: 0
BRUISES/BLEEDS EASILY: 0
FLANK PAIN: 0
ABDOMINAL PAIN: 0
SHORTNESS OF BREATH: 0
FEVER: 0
NERVOUS/ANXIOUS: 1

## 2021-12-18 ASSESSMENT — PAIN DESCRIPTION - PAIN TYPE: TYPE: ACUTE PAIN

## 2021-12-18 NOTE — PROGRESS NOTES
Received report from NOC shift RN. Patient is A&Ox4, no complaints of pain, VSS, no signs of distress. CIWA in place. Patient calls appropriately. All questions and concerns answered, bed in lowest and locked position, call light in reach, will continue to monitor.

## 2021-12-18 NOTE — PROGRESS NOTES
[facility] Medicine Daily Progress Note    Date of Service  12/18/2021    Chief Complaint  Trevon Brennan is a 51 y.o. female admitted 12/17/2021 with Alcohol withdrawal w hallucination     Hospital Course  51 years old female with past medical history of alcohol dependence admitted December 17 with alcohol withdrawal complicated by hallucinations.  Patient started on CIWA protocol.  She has been found to have severe hypokalemia down to 2.7.  Being replaced with intravenous and oral potassium    Interval Problem Update  Tachycardia, improving compared to yesterday.  Hypokalemia, 2.7, replacing, will check magnesium.  Patient is still experiencing visual hallucinations, CIWA score in the range of 15±  I have personally seen and examined the patient at bedside. I discussed the plan of care with patient and bedside RN    Consultants/Specialty  None     Code Status  Full Code    Disposition  Patient is not medically cleared.   Anticipate discharge to home in 2 days   I have placed the appropriate orders for post-discharge needs.    Review of Systems  Review of Systems   Constitutional: Positive for diaphoresis. Negative for chills and fever.   Eyes: Negative for discharge and redness.   Respiratory: Negative for cough, shortness of breath and stridor.    Cardiovascular: Positive for palpitations. Negative for chest pain and leg swelling.   Gastrointestinal: Negative for abdominal pain and vomiting.   Genitourinary: Negative for flank pain.   Musculoskeletal: Negative for myalgias.   Skin: Negative.    Neurological: Negative for focal weakness.   Endo/Heme/Allergies: Does not bruise/bleed easily.   Psychiatric/Behavioral: Positive for hallucinations (Visual). The patient is nervous/anxious.       Physical Exam  Temp:  [35.9 °C (96.7 °F)-36.7 °C (98 °F)] 35.9 °C (96.7 °F)  Pulse:  [] 79  Resp:  [16-19] 16  BP: (134-183)/() 139/98  SpO2:  [95 %-99 %] 97 %    Physical Exam  Constitutional:       General: She  is not in acute distress.  HENT:      Head: Normocephalic and atraumatic.      Right Ear: External ear normal.      Left Ear: External ear normal.      Nose: No congestion or rhinorrhea.      Mouth/Throat:      Mouth: Mucous membranes are dry.      Pharynx: No oropharyngeal exudate or posterior oropharyngeal erythema.   Eyes:      General: No scleral icterus.        Right eye: No discharge.         Left eye: No discharge.      Conjunctiva/sclera: Conjunctivae normal.      Pupils: Pupils are equal, round, and reactive to light.   Cardiovascular:      Rate and Rhythm: Tachycardia present.      Heart sounds: No friction rub. No gallop.    Pulmonary:      Effort: Pulmonary effort is normal.   Abdominal:      General: Abdomen is flat. There is no distension.      Palpations: Abdomen is soft.      Tenderness: There is no guarding.   Musculoskeletal:         General: No swelling.      Cervical back: Neck supple. No rigidity. No muscular tenderness.      Right lower leg: No edema.      Left lower leg: No edema.   Skin:     General: Skin is dry.      Capillary Refill: Capillary refill takes 2 to 3 seconds.      Coloration: Skin is not jaundiced or pale.      Findings: No bruising or erythema.   Neurological:      Mental Status: She is alert and oriented to person, place, and time.      Coordination: Coordination abnormal.   Psychiatric:      Comments: Anxious, visual hallucination       Fluids    Intake/Output Summary (Last 24 hours) at 12/18/2021 1020  Last data filed at 12/18/2021 0827  Gross per 24 hour   Intake 1130.63 ml   Output --   Net 1130.63 ml     Laboratory  Recent Labs     12/17/21  1327 12/18/21  0230   WBC 7.7 4.3*   RBC 5.26 4.61   HEMOGLOBIN 15.4 13.4   HEMATOCRIT 44.6 39.8   MCV 84.8 86.3   MCH 29.3 29.1   MCHC 34.5 33.7   RDW 42.5 43.6   PLATELETCT 228 186   MPV 9.3 9.7     Recent Labs     12/17/21  1327 12/18/21  0230   SODIUM 138 138   POTASSIUM 3.4* 2.7*   CHLORIDE 97 99   CO2 20 26   GLUCOSE 120* 125*    BUN 9 11   CREATININE 0.54 0.68   CALCIUM 9.5 9.1                   Imaging  No orders to display      Assessment/Plan  * Alcohol withdrawal delirium (HCC)- (present on admission)  Assessment & Plan  CIWA protocol   Monitor electrolytes and replace accordingly, particularly magnesium, potassium and phosphorus  Fall, seizure, aspiration precautions.  Thiamine folic acid and multivitamin.    Hypokalemia- (present on admission)  Assessment & Plan  Replacing, checking magnesium    Continue to monitor replace as needed      Visual hallucination- (present on admission)  Assessment & Plan  Secondary to alcohol withdrawal   Improving     VTE prophylaxis: SCDs/TEDs and enoxaparin ppx   I have performed a physical exam and reviewed and updated ROS and Plan today (12/18/2021). In review of yesterday's note (12/17/2021), there are no changes except as documented above.

## 2021-12-18 NOTE — PROGRESS NOTES
Lab called with critical result of potassium at 2.7. Critical lab result read back to Lab.   Dr. Jarrett notified of critical lab result at 0356.  Critical lab result read back by Dr. Jarrett. Orders received for 40 mEq Q4 hrs x2 doses. Order placed and timed to start with 0600 medications.

## 2021-12-18 NOTE — PROGRESS NOTES
4 Eyes Skin Assessment Completed by JEREMIE Blanco and JEREMIE Head.    Head WDL  Ears red and blanching from patients glasses  Nose WDL  Mouth WDL  Neck WDL  Breast/Chest WDL  Shoulder Blades WDL  Spine WDL  (R) Arm/Elbow/Hand WDL  (L) Arm/Elbow/Hand WDL  Abdomen WDL  Groin WDL  Scrotum/Coccyx/Buttocks WDL  (R) Leg WDL  (L) Leg WDL  (R) Heel/Foot/Toe WDL  (L) Heel/Foot/Toe WDL          Devices In Places Tele Box      Interventions In Place pillows for positioning    Possible Skin Injury No    Pictures Uploaded Into Epic N/A  Wound Consult Placed N/A  RN Wound Prevention Protocol Ordered No

## 2021-12-18 NOTE — CARE PLAN
The patient is Stable - Low risk of patient condition declining or worsening    Shift Goals  Clinical Goals: Monitor CIWA  Patient Goals: rest  Family Goals: SARAI      Problem: Knowledge Deficit - Standard  Goal: Patient and family/care givers will demonstrate understanding of plan of care, disease process/condition, diagnostic tests and medications  Outcome: Progressing     Problem: Lifestyle Changes  Goal: Patient's ability to identify lifestyle changes and available resources to help reduce recurrence of condition will improve  Outcome: Progressing

## 2021-12-18 NOTE — PROGRESS NOTES
Handoff report received. Assumed patient care. PT was transported to the floor via ACLS JEREMIE Head. PT is  AAOx4, active CIWA, complaints of HA, auditory and visual hallucinations.. Tele box is on,  POC discussed with PT and all questions addressed. Safety precautions in place. Call light and personal belongings within reach. Educated to call for assistance if needed.

## 2021-12-19 ENCOUNTER — HOSPITAL ENCOUNTER (EMERGENCY)
Facility: MEDICAL CENTER | Age: 51
DRG: 894 | End: 2021-12-19
Attending: EMERGENCY MEDICINE
Payer: COMMERCIAL

## 2021-12-19 VITALS
TEMPERATURE: 97.9 F | WEIGHT: 154.1 LBS | HEIGHT: 62 IN | DIASTOLIC BLOOD PRESSURE: 97 MMHG | HEART RATE: 96 BPM | RESPIRATION RATE: 18 BRPM | OXYGEN SATURATION: 96 % | SYSTOLIC BLOOD PRESSURE: 143 MMHG | BODY MASS INDEX: 28.36 KG/M2

## 2021-12-19 VITALS
BODY MASS INDEX: 28.76 KG/M2 | HEIGHT: 62 IN | OXYGEN SATURATION: 97 % | RESPIRATION RATE: 16 BRPM | DIASTOLIC BLOOD PRESSURE: 129 MMHG | TEMPERATURE: 96.8 F | HEART RATE: 107 BPM | SYSTOLIC BLOOD PRESSURE: 161 MMHG | WEIGHT: 156.31 LBS

## 2021-12-19 DIAGNOSIS — F10.930 ALCOHOL WITHDRAWAL SYNDROME WITHOUT COMPLICATION (HCC): ICD-10-CM

## 2021-12-19 PROBLEM — R44.1 VISUAL HALLUCINATION: Status: ACTIVE | Noted: 2021-12-19

## 2021-12-19 PROBLEM — F10.931 ALCOHOL WITHDRAWAL DELIRIUM (HCC): Status: RESOLVED | Noted: 2021-12-17 | Resolved: 2021-12-19

## 2021-12-19 LAB
ALBUMIN SERPL BCP-MCNC: 4.4 G/DL (ref 3.2–4.9)
ALBUMIN/GLOB SERPL: 1.8 G/DL
ALP SERPL-CCNC: 119 U/L (ref 30–99)
ALT SERPL-CCNC: 41 U/L (ref 2–50)
ANION GAP SERPL CALC-SCNC: 11 MMOL/L (ref 7–16)
AST SERPL-CCNC: 53 U/L (ref 12–45)
BASOPHILS # BLD AUTO: 0.5 % (ref 0–1.8)
BASOPHILS # BLD: 0.02 K/UL (ref 0–0.12)
BILIRUB SERPL-MCNC: 0.5 MG/DL (ref 0.1–1.5)
BUN SERPL-MCNC: 7 MG/DL (ref 8–22)
CALCIUM SERPL-MCNC: 9.5 MG/DL (ref 8.5–10.5)
CHLORIDE SERPL-SCNC: 102 MMOL/L (ref 96–112)
CO2 SERPL-SCNC: 25 MMOL/L (ref 20–33)
CREAT SERPL-MCNC: 0.43 MG/DL (ref 0.5–1.4)
EOSINOPHIL # BLD AUTO: 0.16 K/UL (ref 0–0.51)
EOSINOPHIL NFR BLD: 3.6 % (ref 0–6.9)
ERYTHROCYTE [DISTWIDTH] IN BLOOD BY AUTOMATED COUNT: 42.1 FL (ref 35.9–50)
GLOBULIN SER CALC-MCNC: 2.4 G/DL (ref 1.9–3.5)
GLUCOSE SERPL-MCNC: 100 MG/DL (ref 65–99)
HCT VFR BLD AUTO: 39.2 % (ref 37–47)
HGB BLD-MCNC: 13.2 G/DL (ref 12–16)
IMM GRANULOCYTES # BLD AUTO: 0.02 K/UL (ref 0–0.11)
IMM GRANULOCYTES NFR BLD AUTO: 0.5 % (ref 0–0.9)
LYMPHOCYTES # BLD AUTO: 1.17 K/UL (ref 1–4.8)
LYMPHOCYTES NFR BLD: 26.7 % (ref 22–41)
MAGNESIUM SERPL-MCNC: 2.2 MG/DL (ref 1.5–2.5)
MCH RBC QN AUTO: 29.1 PG (ref 27–33)
MCHC RBC AUTO-ENTMCNC: 33.7 G/DL (ref 33.6–35)
MCV RBC AUTO: 86.5 FL (ref 81.4–97.8)
MONOCYTES # BLD AUTO: 0.35 K/UL (ref 0–0.85)
MONOCYTES NFR BLD AUTO: 8 % (ref 0–13.4)
NEUTROPHILS # BLD AUTO: 2.67 K/UL (ref 2–7.15)
NEUTROPHILS NFR BLD: 60.7 % (ref 44–72)
NRBC # BLD AUTO: 0 K/UL
NRBC BLD-RTO: 0 /100 WBC
PLATELET # BLD AUTO: 178 K/UL (ref 164–446)
PMV BLD AUTO: 9.5 FL (ref 9–12.9)
POTASSIUM SERPL-SCNC: 3.5 MMOL/L (ref 3.6–5.5)
PROT SERPL-MCNC: 6.8 G/DL (ref 6–8.2)
RBC # BLD AUTO: 4.53 M/UL (ref 4.2–5.4)
SODIUM SERPL-SCNC: 138 MMOL/L (ref 135–145)
WBC # BLD AUTO: 4.4 K/UL (ref 4.8–10.8)

## 2021-12-19 PROCEDURE — 83735 ASSAY OF MAGNESIUM: CPT

## 2021-12-19 PROCEDURE — A9270 NON-COVERED ITEM OR SERVICE: HCPCS | Performed by: INTERNAL MEDICINE

## 2021-12-19 PROCEDURE — 700102 HCHG RX REV CODE 250 W/ 637 OVERRIDE(OP): Performed by: INTERNAL MEDICINE

## 2021-12-19 PROCEDURE — 700102 HCHG RX REV CODE 250 W/ 637 OVERRIDE(OP): Performed by: STUDENT IN AN ORGANIZED HEALTH CARE EDUCATION/TRAINING PROGRAM

## 2021-12-19 PROCEDURE — 700111 HCHG RX REV CODE 636 W/ 250 OVERRIDE (IP): Performed by: INTERNAL MEDICINE

## 2021-12-19 PROCEDURE — A9270 NON-COVERED ITEM OR SERVICE: HCPCS | Performed by: HOSPITALIST

## 2021-12-19 PROCEDURE — 99283 EMERGENCY DEPT VISIT LOW MDM: CPT

## 2021-12-19 PROCEDURE — 36415 COLL VENOUS BLD VENIPUNCTURE: CPT

## 2021-12-19 PROCEDURE — 80053 COMPREHEN METABOLIC PANEL: CPT

## 2021-12-19 PROCEDURE — 700102 HCHG RX REV CODE 250 W/ 637 OVERRIDE(OP): Performed by: HOSPITALIST

## 2021-12-19 PROCEDURE — A9270 NON-COVERED ITEM OR SERVICE: HCPCS | Performed by: STUDENT IN AN ORGANIZED HEALTH CARE EDUCATION/TRAINING PROGRAM

## 2021-12-19 PROCEDURE — 85025 COMPLETE CBC W/AUTO DIFF WBC: CPT

## 2021-12-19 PROCEDURE — 99232 SBSQ HOSP IP/OBS MODERATE 35: CPT | Performed by: HOSPITALIST

## 2021-12-19 RX ORDER — POTASSIUM CHLORIDE 20 MEQ/1
20 TABLET, EXTENDED RELEASE ORAL DAILY
Qty: 30 TABLET | Refills: 0 | Status: SHIPPED | OUTPATIENT
Start: 2021-12-19 | End: 2021-12-19 | Stop reason: SDUPTHER

## 2021-12-19 RX ORDER — POTASSIUM CHLORIDE 20 MEQ/1
40 TABLET, EXTENDED RELEASE ORAL ONCE
Status: COMPLETED | OUTPATIENT
Start: 2021-12-19 | End: 2021-12-19

## 2021-12-19 RX ORDER — FOLIC ACID 1 MG/1
1 TABLET ORAL DAILY
Qty: 30 TABLET | Refills: 1 | Status: SHIPPED | OUTPATIENT
Start: 2021-12-20

## 2021-12-19 RX ORDER — LANOLIN ALCOHOL/MO/W.PET/CERES
100 CREAM (GRAM) TOPICAL DAILY
Qty: 30 TABLET | Refills: 1 | Status: SHIPPED | OUTPATIENT
Start: 2021-12-20 | End: 2021-12-19

## 2021-12-19 RX ORDER — FOLIC ACID 1 MG/1
1 TABLET ORAL DAILY
Qty: 30 TABLET | Refills: 1 | Status: SHIPPED | OUTPATIENT
Start: 2021-12-20 | End: 2021-12-19 | Stop reason: SDUPTHER

## 2021-12-19 RX ORDER — POTASSIUM CHLORIDE 20 MEQ/1
20 TABLET, EXTENDED RELEASE ORAL DAILY
Qty: 30 TABLET | Refills: 0 | Status: SHIPPED | OUTPATIENT
Start: 2021-12-19

## 2021-12-19 RX ADMIN — POTASSIUM CHLORIDE 40 MEQ: 1500 TABLET, EXTENDED RELEASE ORAL at 08:33

## 2021-12-19 RX ADMIN — POTASSIUM CHLORIDE 40 MEQ: 1500 TABLET, EXTENDED RELEASE ORAL at 05:48

## 2021-12-19 RX ADMIN — LORAZEPAM 2 MG: 2 TABLET ORAL at 08:33

## 2021-12-19 RX ADMIN — ACETAMINOPHEN 650 MG: 325 TABLET, FILM COATED ORAL at 04:37

## 2021-12-19 RX ADMIN — LORAZEPAM 1 MG: 1 TABLET ORAL at 05:48

## 2021-12-19 RX ADMIN — ACETAMINOPHEN 650 MG: 325 TABLET, FILM COATED ORAL at 12:00

## 2021-12-19 RX ADMIN — LORAZEPAM 2 MG: 2 TABLET ORAL at 00:47

## 2021-12-19 RX ADMIN — Medication 1 MG: at 05:49

## 2021-12-19 RX ADMIN — Medication 100 MG: at 05:49

## 2021-12-19 RX ADMIN — LORAZEPAM 2 MG: 2 TABLET ORAL at 02:35

## 2021-12-19 RX ADMIN — LORAZEPAM 2 MG: 2 TABLET ORAL at 12:00

## 2021-12-19 RX ADMIN — THERA TABS 1 TABLET: TAB at 05:49

## 2021-12-19 RX ADMIN — LORAZEPAM 3 MG: 1 TABLET ORAL at 04:37

## 2021-12-19 RX ADMIN — ENOXAPARIN SODIUM 40 MG: 40 INJECTION SUBCUTANEOUS at 05:49

## 2021-12-19 RX ADMIN — LORAZEPAM 3 MG: 1 TABLET ORAL at 03:12

## 2021-12-19 ASSESSMENT — LIFESTYLE VARIABLES
ORIENTATION AND CLOUDING OF SENSORIUM: ORIENTED AND CAN DO SERIAL ADDITIONS
TREMOR: TREMOR NOT VISIBLE BUT CAN BE FELT, FINGERTIP TO FINGERTIP
VISUAL DISTURBANCES: NOT PRESENT
PAROXYSMAL SWEATS: NO SWEAT VISIBLE
VISUAL DISTURBANCES: MODERATE SENSITIVITY
PAROXYSMAL SWEATS: NO SWEAT VISIBLE
ANXIETY: *
AUDITORY DISTURBANCES: NOT PRESENT
ANXIETY: *
PAROXYSMAL SWEATS: NO SWEAT VISIBLE
ORIENTATION AND CLOUDING OF SENSORIUM: ORIENTED AND CAN DO SERIAL ADDITIONS
TOTAL SCORE: 12
NAUSEA AND VOMITING: MILD NAUSEA WITH NO VOMITING
TREMOR: TREMOR NOT VISIBLE BUT CAN BE FELT, FINGERTIP TO FINGERTIP
VISUAL DISTURBANCES: NOT PRESENT
ORIENTATION AND CLOUDING OF SENSORIUM: ORIENTED AND CAN DO SERIAL ADDITIONS
HEADACHE, FULLNESS IN HEAD: MODERATE
VISUAL DISTURBANCES: NOT PRESENT
TREMOR: *
TOTAL SCORE: MODERATE ITCHING, PINS AND NEEDLES SENSATION, BURNING OR NUMBNESS
TOTAL SCORE: MILD ITCHING, PINS AND NEEDLES SENSATION, BURNING OR NUMBNESS
VISUAL DISTURBANCES: MODERATELY SEVERE HALLUCINATIONS
TOTAL SCORE: 13
HEADACHE, FULLNESS IN HEAD: MODERATE
NAUSEA AND VOMITING: MILD NAUSEA WITH NO VOMITING
NAUSEA AND VOMITING: *
AUDITORY DISTURBANCES: NOT PRESENT
NAUSEA AND VOMITING: NO NAUSEA AND NO VOMITING
HEADACHE, FULLNESS IN HEAD: MODERATELY SEVERE
ORIENTATION AND CLOUDING OF SENSORIUM: ORIENTED AND CAN DO SERIAL ADDITIONS
TREMOR: TREMOR NOT VISIBLE BUT CAN BE FELT, FINGERTIP TO FINGERTIP
NAUSEA AND VOMITING: MILD NAUSEA WITH NO VOMITING
ORIENTATION AND CLOUDING OF SENSORIUM: ORIENTED AND CAN DO SERIAL ADDITIONS
TREMOR: *
ORIENTATION AND CLOUDING OF SENSORIUM: ORIENTED AND CAN DO SERIAL ADDITIONS
AUDITORY DISTURBANCES: NOT PRESENT
TOTAL SCORE: MILD ITCHING, PINS AND NEEDLES SENSATION, BURNING OR NUMBNESS
HEADACHE, FULLNESS IN HEAD: MODERATE
TOTAL SCORE: MILD ITCHING, PINS AND NEEDLES SENSATION, BURNING OR NUMBNESS
TREMOR: TREMOR NOT VISIBLE BUT CAN BE FELT, FINGERTIP TO FINGERTIP
TOTAL SCORE: MODERATE ITCHING, PINS AND NEEDLES SENSATION, BURNING OR NUMBNESS
AGITATION: *
NAUSEA AND VOMITING: MILD NAUSEA WITH NO VOMITING
ANXIETY: NO ANXIETY (AT EASE)
AUDITORY DISTURBANCES: NOT PRESENT
HEADACHE, FULLNESS IN HEAD: MODERATELY SEVERE
AGITATION: *
PAROXYSMAL SWEATS: NO SWEAT VISIBLE
ANXIETY: NO ANXIETY (AT EASE)
AGITATION: SOMEWHAT MORE THAN NORMAL ACTIVITY
HEADACHE, FULLNESS IN HEAD: MODERATE
PAROXYSMAL SWEATS: NO SWEAT VISIBLE
AGITATION: *
ANXIETY: *
PAROXYSMAL SWEATS: NO SWEAT VISIBLE
TOTAL SCORE: 14
TREMOR: TREMOR NOT VISIBLE BUT CAN BE FELT, FINGERTIP TO FINGERTIP
PAROXYSMAL SWEATS: NO SWEAT VISIBLE
AUDITORY DISTURBANCES: NOT PRESENT
TOTAL SCORE: 7
ANXIETY: NO ANXIETY (AT EASE)
PAROXYSMAL SWEATS: NO SWEAT VISIBLE
TOTAL SCORE: 11
AGITATION: MODERATELY FIDGETY AND RESTLESS
VISUAL DISTURBANCES: MILD SENSITIVITY
AGITATION: NORMAL ACTIVITY
TOTAL SCORE: MODERATE ITCHING, PINS AND NEEDLES SENSATION, BURNING OR NUMBNESS
AGITATION: NORMAL ACTIVITY
TOTAL SCORE: 8
ANXIETY: *
VISUAL DISTURBANCES: NOT PRESENT
TREMOR: *
TOTAL SCORE: 19
HEADACHE, FULLNESS IN HEAD: MODERATE
ORIENTATION AND CLOUDING OF SENSORIUM: ORIENTED AND CAN DO SERIAL ADDITIONS
NAUSEA AND VOMITING: NO NAUSEA AND NO VOMITING
AGITATION: NORMAL ACTIVITY
AUDITORY DISTURBANCES: NOT PRESENT
AUDITORY DISTURBANCES: VERY MILD HARSHNESS OR ABILITY TO FRIGHTEN
TOTAL SCORE: 20
ORIENTATION AND CLOUDING OF SENSORIUM: ORIENTED AND CAN DO SERIAL ADDITIONS
ANXIETY: *
AUDITORY DISTURBANCES: NOT PRESENT
HEADACHE, FULLNESS IN HEAD: MODERATE
VISUAL DISTURBANCES: MODERATELY SEVERE HALLUCINATIONS
TOTAL SCORE: MODERATE ITCHING, PINS AND NEEDLES SENSATION, BURNING OR NUMBNESS
NAUSEA AND VOMITING: *

## 2021-12-19 ASSESSMENT — ENCOUNTER SYMPTOMS
NERVOUS/ANXIOUS: 1
HALLUCINATIONS: 1
STRIDOR: 0
PALPITATIONS: 1
CHILLS: 0
MYALGIAS: 0
FEVER: 0
ABDOMINAL PAIN: 0
DIAPHORESIS: 1
COUGH: 0
SHORTNESS OF BREATH: 0
FOCAL WEAKNESS: 0
VOMITING: 0
EYE DISCHARGE: 0
BRUISES/BLEEDS EASILY: 0
EYE REDNESS: 0
FLANK PAIN: 0

## 2021-12-19 ASSESSMENT — PAIN DESCRIPTION - PAIN TYPE: TYPE: ACUTE PAIN

## 2021-12-19 ASSESSMENT — FIBROSIS 4 INDEX: FIB4 SCORE: 2.46

## 2021-12-19 NOTE — ED TRIAGE NOTES
"Chief Complaint   Patient presents with   • Psychiatric Evaluation     Pt states that she's here because she believes that she was over medicated during her last hospitalization and she wants to get a work note. States she was hospitalized on the \"eleventeenth of this most recent time.\" States she was put in the puppy room of the \"green tower in the [facility] green rooms\".     Pt educated upon triage process and told to inform  staff of any changes in condition so that Pt may be reassessed. No further questions at this time. Pt sitting out in lobby.    "

## 2021-12-19 NOTE — ED NOTES
Patient ambulated to room 34 green. Patient sitting on bed. Patient AxO x4. Patient asking for doctors not for her stay after leaving AMA today.

## 2021-12-19 NOTE — ED NOTES
Given discharge instructions, verbalized understanding, left with all belongings, ambulates to ED lobby.  Given a work note.

## 2021-12-19 NOTE — PROGRESS NOTES
Hospital Medicine Daily Progress Note    Date of Service  12/19/2021    Chief Complaint  Trevon Brennan is a 51 y.o. female admitted 12/17/2021 with Alcohol withdrawal w hallucination     Hospital Course  51 years old female with past medical history of alcohol dependence admitted December 17 with alcohol withdrawal complicated by hallucinations.  Patient started on CIWA protocol.  She has been found to have severe hypokalemia down to 2.7.  Being replaced with intravenous and oral potassium    Interval Problem Update  HR 90's - 100's   Still going through withdrawals, tremors  CIWA scores ~ 13   Hypokalemia replacing    Hallucinations resolved.  I have personally seen and examined the patient at bedside. I discussed the plan of care with patient and bedside RN    Consultants/Specialty  None     Code Status  Full Code    Disposition  Patient is not medically cleared.   Anticipate discharge to home in 1 days   I have placed the appropriate orders for post-discharge needs.    Review of Systems  Review of Systems   Constitutional: Positive for diaphoresis. Negative for chills and fever.   Eyes: Negative for discharge and redness.   Respiratory: Negative for cough, shortness of breath and stridor.    Cardiovascular: Positive for palpitations. Negative for chest pain and leg swelling.   Gastrointestinal: Negative for abdominal pain and vomiting.   Genitourinary: Negative for flank pain.   Musculoskeletal: Negative for myalgias.   Skin: Negative.    Neurological: Negative for focal weakness.   Endo/Heme/Allergies: Does not bruise/bleed easily.   Psychiatric/Behavioral: Positive for hallucinations (Visual). The patient is nervous/anxious.       Physical Exam  Temp:  [36 °C (96.8 °F)-36.9 °C (98.5 °F)] 36.6 °C (97.9 °F)  Pulse:  [] 96  Resp:  [16-18] 18  BP: (123-155)/() 143/97  SpO2:  [93 %-97 %] 96 %    Physical Exam  Constitutional:       General: She is not in acute distress.  HENT:      Head:  Normocephalic and atraumatic.      Right Ear: External ear normal.      Left Ear: External ear normal.      Nose: No congestion or rhinorrhea.      Mouth/Throat:      Mouth: Mucous membranes are dry.      Pharynx: No oropharyngeal exudate or posterior oropharyngeal erythema.   Eyes:      General: No scleral icterus.        Right eye: No discharge.         Left eye: No discharge.      Conjunctiva/sclera: Conjunctivae normal.      Pupils: Pupils are equal, round, and reactive to light.   Cardiovascular:      Rate and Rhythm: Tachycardia present.      Heart sounds: No friction rub. No gallop.    Pulmonary:      Effort: Pulmonary effort is normal.   Abdominal:      General: Abdomen is flat. There is no distension.      Palpations: Abdomen is soft.      Tenderness: There is no guarding.   Musculoskeletal:         General: No swelling.      Cervical back: Neck supple. No rigidity. No muscular tenderness.      Right lower leg: No edema.      Left lower leg: No edema.   Skin:     General: Skin is dry.      Capillary Refill: Capillary refill takes 2 to 3 seconds.      Coloration: Skin is not jaundiced or pale.      Findings: No bruising or erythema.   Neurological:      Mental Status: She is alert and oriented to person, place, and time.      Coordination: Coordination abnormal.   Psychiatric:      Comments: Anxious, visual hallucination       Fluids    Intake/Output Summary (Last 24 hours) at 12/19/2021 1235  Last data filed at 12/19/2021 0938  Gross per 24 hour   Intake 1000 ml   Output --   Net 1000 ml     Laboratory  Recent Labs     12/17/21  1327 12/18/21  0230 12/19/21  0340   WBC 7.7 4.3* 4.4*   RBC 5.26 4.61 4.53   HEMOGLOBIN 15.4 13.4 13.2   HEMATOCRIT 44.6 39.8 39.2   MCV 84.8 86.3 86.5   MCH 29.3 29.1 29.1   MCHC 34.5 33.7 33.7   RDW 42.5 43.6 42.1   PLATELETCT 228 186 178   MPV 9.3 9.7 9.5     Recent Labs     12/18/21  1331 12/18/21  1736 12/19/21  0340   SODIUM 138 139 138   POTASSIUM 4.1 3.8 3.5*   CHLORIDE 104  104 102   CO2 23 24 25   GLUCOSE 103* 116* 100*   BUN 8 9 7*   CREATININE 0.53 0.63 0.43*   CALCIUM 9.3 9.1 9.5                   Imaging  No orders to display      Assessment/Plan  * Alcohol withdrawal delirium (HCC)- (present on admission)  Assessment & Plan  [facility] protocol   Monitor electrolytes and replace accordingly, particularly magnesium, potassium and phosphorus  Fall, seizure, aspiration precautions.  Thiamine folic acid and multivitamin.    Visual hallucination- (present on admission)  Assessment & Plan  Secondary to alcohol withdrawal   Resolved    Hypokalemia- (present on admission)  Assessment & Plan  Replacing, checking magnesium    Continue to monitor replace as needed      VTE prophylaxis: SCDs/TEDs and enoxaparin ppx   I have performed a physical exam and reviewed and updated ROS and Plan today (12/19/2021). In review of yesterday's note (12/18/2021), there are no changes except as documented above.

## 2021-12-19 NOTE — DISCHARGE SUMMARY
AGAINST MEDICAL ADVICE hospital stay Summary    CHIEF COMPLAINT ON ADMISSION  Chief Complaint   Patient presents with   • ETOH Withdrawal     Last drink 3am today, pt states typical amount 1 pint liquor / day, pt states sober but started drinking again 2 weeks ago. Denies seizure hx. Pt has slight tremor, itching, visual hallucinations, nausea, anxiety, and HA. Symptoms are all mild at triage.        Reason for Admission  Alcohol Withdrawals     Admission Date  12/17/2021    CODE STATUS  Full Code    HPI & HOSPITAL COURSE  51 years old female with a past medical history of alcohol dependence admitted on December 17, 2021 with alcohol withdrawal complicated by hallucinations.  Patient started on CIWA protocol with improvement in her condition.  She was found to have hypokalemia that was replaced.  On 12/19/2021 patient wanted to leave AGAINST MEDICAL ADVICE.  She was still having a relatively high CIWA scores in the range of 14-15 however was alert and oriented x4 and understands the risks of her decision including significant injury falls and even death.  She was able to rationalize when to come back to the emergency room, seek help, call 91. 1.     Leaving AGAINST MEDICAL ADVICE Date  12/19/2021     FOLLOW UP ITEMS POST DISCHARGE  Follow-up with your primary care provider soon as able, preferably within 3 days    DISCHARGE DIAGNOSES  Principal Problem:    Alcohol withdrawal delirium (HCC) POA: Yes  Active Problems:    Hypokalemia POA: Yes    Visual hallucination POA: Yes  Resolved Problems:    * No resolved hospital problems. *    FOLLOW UP  Follow-up with your primary care provider soon as able, preferably within 3 days    MEDICATIONS ON DISCHARGE     Medication List      START taking these medications      Instructions   folic acid 1 MG Tabs  Start taking on: December 20, 2021  Commonly known as: FOLVITE   Take 1 Tablet by mouth every day.  Dose: 1 mg     multivitamin Tabs  Start taking on: December 20, 2021    Take 1 Tablet by mouth every day.  Dose: 1 Tablet     potassium chloride SA 20 MEQ Tbcr  Commonly known as: Kdur   Take 1 Tablet by mouth every day.  Dose: 20 mEq        CONTINUE taking these medications      Instructions   MUCINEX PO   Take 1 Tablet by mouth 2 times a day as needed (Phlegm).  Dose: 1 Tablet     Tums E-X 750 750 MG chewable tablet  Generic drug: calcium carbonate   Chew 3-4 Tablets at bedtime as needed (Gastroesophageal Reflux).  Dose: 3-4 Tablet          Allergies  Allergies   Allergen Reactions   • Erythromycin Hives     Hives for a week     DIET  Orders Placed This Encounter   Procedures   • Diet Order Diet: Regular     Standing Status:   Standing     Number of Occurrences:   1     Order Specific Question:   Diet:     Answer:   Regular [1]     LABORATORY  Lab Results   Component Value Date    SODIUM 138 12/19/2021    POTASSIUM 3.5 (L) 12/19/2021    CHLORIDE 102 12/19/2021    CO2 25 12/19/2021    GLUCOSE 100 (H) 12/19/2021    BUN 7 (L) 12/19/2021    CREATININE 0.43 (L) 12/19/2021    CREATININE 0.7 12/07/2006        Lab Results   Component Value Date    WBC 4.4 (L) 12/19/2021    HEMOGLOBIN 13.2 12/19/2021    HEMATOCRIT 39.2 12/19/2021    PLATELETCT 178 12/19/2021      Total time of the discharge process exceeds 32 minutes.   no

## 2021-12-19 NOTE — PROGRESS NOTES
Received bedside report from RN, pt care assumed, VSS, pt assessment complete. Pt AAOx4. No signs of acute distress noted at this time. Plan of care discussed with pt and verbalizes no questions. Pt denies any additional needs at this time. Bed locked/in lowest position, bed alarm on, pt educated on fall risk and verbalized understanding, call light within reach, hourly rounding initiated.

## 2021-12-19 NOTE — ED PROVIDER NOTES
"ED Provider Note    Scribed for Jose A Nix by Abbie Lara. 12/19/2021  1:12 PM    Primary care provider: Mohsen Tamasaby, M.D.  Means of arrival: Walk in   History obtained from: Patient  History limited by: None    CHIEF COMPLAINT  Chief Complaint   Patient presents with   • Psychiatric Evaluation     Pt states that she's here because she believes that she was over medicated during her last hospitalization and she wants to get a work note. States she was hospitalized on the \"eleventeenth of this most recent time.\" States she was put in the puppy room of the \"green tower in the [facility] green rooms\".       HPI  Trevon Brennan is a 51 y.o. female who presents to the Emergency Department for evaluation of alcohol detox. Patient reports she left the hospital on 12/17 for alcohol withdrawal. Patient left against medical advice on 12/17 because she states she \"has a low paying job and needs to take the place of the people who are going on vacation.\" She states that she just wants a note saying that she was in the hospital on 12/17 and 12/19. In the ED, she states she is looking for a rehabilitation center for alcoholism. Her last drink was on 12/17 and she has been drinking heavily for the past 2 years. She is not concerned for withdrawal or relapse at this time and is adamant that she will not drink alcohol. Denies suicidal ideation. Denies IV drug use, or tobacco use. History of Depression.       REVIEW OF SYSTEMS  As above, all other systems reviewed and are negative.   See HPI for further details.     PAST MEDICAL HISTORY   has a past medical history of Bowel habit changes, Depression (2016), Heart burn, Indigestion, migraines, and Urinary incontinence.    SURGICAL HISTORY   has a past surgical history that includes other abdominal surgery; other abdominal surgery; gastroscopy-endo (12/21/2011); ercp in or (11/18/2012); vaginal hysterectomy scope total (1/3/2017); anterior and posterior repair " "(1/3/2017); enterocele repair (1/3/2017); bladder sling female (1/3/2017); and vaginal suspension (1/3/2017).    SOCIAL HISTORY  Social History     Tobacco Use   • Smoking status: Never Smoker   • Smokeless tobacco: Never Used   Substance Use Topics   • Alcohol use: No   • Drug use: No      Social History     Substance and Sexual Activity   Drug Use No       FAMILY HISTORY  None reported     CURRENT MEDICATIONS  Current Outpatient Medications   Medication Instructions   • calcium carbonate (TUMS E-X 750) 750 MG chewable tablet 3-4 Tablets, Oral, EVERY BEDTIME PRN   • [START ON 12/20/2021] folic acid (FOLVITE) 1 mg, Oral, DAILY   • guaiFENesin (MUCINEX PO) 1 Tablet, Oral, 2 TIMES DAILY PRN   • [START ON 12/20/2021] multivitamin (THERAGRAN) Tab 1 Tablet, Oral, DAILY   • potassium chloride SA (KDUR) 20 MEQ Tab CR 20 mEq, Oral, DAILY        ALLERGIES  Allergies   Allergen Reactions   • Erythromycin Hives     Hives for a week       PHYSICAL EXAM  VITAL SIGNS:   Vitals:    12/19/21 1239   BP: (!) 161/129   Pulse: (!) 107   Resp: 16   Temp: 36 °C (96.8 °F)   TempSrc: Temporal   SpO2: 97%   Weight: 70.9 kg (156 lb 4.9 oz)   Height: 1.575 m (5' 2\")       Vitals: My interpretation: hypertensive, tachycardic, afebrile, not hypoxic    Reinterpretation of vitals: unchanged     PE:   Constitutional: Well developed, Well nourished, No acute distress, Non-toxic appearance.   HENT: Normocephalic, Atraumatic, Bilateral external ears normal, Oropharynx is clear mucous membranes are moist. No oral exudates or nasal discharge.   Eyes: Pupils are equal round and reactive, EOMI, Conjunctiva normal, No discharge.   Neck: Normal range of motion, No tenderness, Supple, No stridor. No meningismus.  Lymphatic: No lymphadenopathy noted.   Cardiovascular: Regular rate and rhythm without murmur rub or gallop.  Thorax & Lungs: Clear breath sounds bilaterally without wheezes, rhonchi or rales. There is no chest wall tenderness.   Abdomen: Soft " non-tender non-distended. There is no rebound or guarding. No organomegaly is appreciated. Bowel sounds are normal.  Skin: Normal without rash.   Back: No CVA or spinal tenderness.   Extremities: Intact distal pulses, No edema, No tenderness, No cyanosis, No clubbing. Capillary refill is less than 2 seconds.  Musculoskeletal: Good range of motion in all major joints. No tenderness to palpation or major deformities noted.   Neurologic: Alert & oriented x 3, Normal motor function, Normal sensory function, No focal deficits noted. Reflexes are normal.  Psychiatric: Affect normal, Judgment normal, Mood normal. There is no suicidal ideation or patient reported hallucinations.       COURSE & MEDICAL DECISION MAKING  Nursing notes, VS, PMSFHx, labs, imaging, EKG reviewed in chart.    MDM: 1:12 PM Trevon Brennan is a 51 y.o. female who presented with request for work note.  Patient was admitted on the 17th for acute alcohol withdrawal.  She left AGAINST MEDICAL ADVICE this morning about 2 hours prior to arrival to the ED today.  She is asking for work note stating that she was admitted here.  She is mildly tachycardic and I counseled her that I am concerned that she is still likely in mild withdrawal.  At this time she is alert and oriented, has decision-making capacity and does not want further treatment for alcohol withdrawal or inpatient admission.  She denies suicidal ideation, homicidal ideation, does not appear to be responding to inappropriate or external stimuli.  Work note was given, she is ambulatory, benign physical exam, normal vital signs at time of discharge of than mild tachycardia and verbalized understanding discharge, return precautions and I stressed to her that if she would like to continue her treatment to return to the hospital.    1:13 PM Patient evaluated at bedside. I discussed with patient care of plan following discharge. Patient was given opportunity to ask questions at this time. Patient  "verbalizes understanding and agrees to care of plan.  Patient will be discharged at this time. Patient will be discharged with a referral to PCP. Her vital signs prior to discharge: BP (!) 161/129   Pulse (!) 107   Temp 36 °C (96.8 °F) (Temporal)   Resp 16   Ht 1.575 m (5' 2\")   Wt 70.9 kg (156 lb 4.9 oz)   LMP 03/01/2009   SpO2 97%   BMI 28.59 kg/m²      The patient will return for new or worsening symptoms and is stable at the time of discharge.    The patient is referred to a primary physician for blood pressure management, diabetic screening, and for all other preventative health concerns.    DISPOSITION:  Patient will be discharged home in stable condition.    FOLLOW UP:  Mohsen Tamasaby, M.D.  [address] [place] [id number] [phone]      As needed, If symptoms worsen    FINAL IMPRESSION  1. Alcohol withdrawal syndrome without complication (HCC) Acute         Abbie FORBES (Scribe), am scribing for, and in the presence of, Jose A Nix.    Electronically signed by: Abbie Lara (Scribe), 12/19/2021    IJose A personally performed the services described in this documentation, as scribed by Abbie Lara in my presence, and it is both accurate and complete.    C    The note accurately reflects work and decisions made by me.  Jose A Nix  12/19/2021  1:31 PM        "

## 2021-12-19 NOTE — DISCHARGE INSTRUCTIONS
As I discussed with you, there is high concern for alcohol relapse without further help and assistance.  If you change your mind and would like rehabilitation and continued detox from alcohol, please return to the ED.  He can also see your PCP for further evaluation and treatment.  Thank you for coming in today.

## 2021-12-19 NOTE — Clinical Note
Trevon Brennan was seen and treated in our emergency department on 12/19/2021.  She may return to work on 12/22/2021.  Please note that the patient was admitted from 12/17 till 12/19.     If you have any questions or concerns, please don't hesitate to call.      Jose A Nix

## 2021-12-19 NOTE — DISCHARGE INSTRUCTIONS
Discharge Instructions per Warner Mathis M.D.  Follow-up with  primary care provider within 5 days.  DIAGNOSIS:  Alcohol withdrawal delirium, Hypokalemia   Return to ER if you develop any of the following symptoms fever, chills, weakness, not feeling well, rash, bleeding, blurred vision, palpitations, cough, pain in any part of your body, shortness of breath, nausea, vomiting, diarrhea, difficulty with urination, dizziness, headache, seizures, loss of consciousness or if you develop any new symptoms.

## 2021-12-19 NOTE — CARE PLAN
The patient is Watcher - Medium risk of patient condition declining or worsening    Shift Goals  Clinical Goals: monitor ciwa  Patient Goals: rest  Family Goals: SARAI    Progress made toward(s) clinical / shift goals:    Problem: Psychosocial  Goal: Patient's level of anxiety will decrease  Outcome: Progressing     Problem: Risk for Aspiration  Goal: Patient's risk for aspiration will be absent or decrease  Outcome: Progressing       Patient is not progressing towards the following goals:

## 2021-12-19 NOTE — PROGRESS NOTES
Report received from night shift RN, assumed care of pt. Pt A&Ox4. Plan of care discussed with pt, labs and chart reviewed. All needs met at this time. Tele box on. On RA. Call light within reach, bed locked and in lowest position. All fall precautions and hourly rounding in place. Will continue to monitor.

## 2021-12-19 NOTE — PROGRESS NOTES
"Patient has chosen to leave the hospital against medical advice. Pt answers orientation correctly at this time, states \"it is my legal right to leave\". Pt educated on potential risks associated with leaving hospital without medical clearance from physician. This RN spoke at length with pt regarding her decision, advised staying for improvement in clinical status. PT remained adamant about leaving. Dr. Mathis updated. PIV and tele box removed. Pt asked for copy of medical records; this RN educated pt that she does not get any discharge paperwork since she is leaving against medical advice. Pt verbalized understanding. Pt left with all belongings. Pt escorted out by RN.        "

## 2021-12-23 LAB — EKG IMPRESSION: NORMAL

## 2021-12-23 PROCEDURE — 93005 ELECTROCARDIOGRAM TRACING: CPT | Performed by: EMERGENCY MEDICINE

## 2021-12-23 PROCEDURE — 93005 ELECTROCARDIOGRAM TRACING: CPT

## 2021-12-23 PROCEDURE — 99285 EMERGENCY DEPT VISIT HI MDM: CPT

## 2021-12-23 ASSESSMENT — FIBROSIS 4 INDEX: FIB4 SCORE: 2.46

## 2021-12-24 ENCOUNTER — HOSPITAL ENCOUNTER (EMERGENCY)
Facility: MEDICAL CENTER | Age: 51
End: 2021-12-24
Attending: EMERGENCY MEDICINE
Payer: COMMERCIAL

## 2021-12-24 VITALS
WEIGHT: 156 LBS | HEART RATE: 85 BPM | BODY MASS INDEX: 28.71 KG/M2 | OXYGEN SATURATION: 97 % | HEIGHT: 62 IN | RESPIRATION RATE: 16 BRPM | SYSTOLIC BLOOD PRESSURE: 124 MMHG | DIASTOLIC BLOOD PRESSURE: 71 MMHG | TEMPERATURE: 98 F

## 2021-12-24 DIAGNOSIS — F10.930 ALCOHOL WITHDRAWAL SYNDROME WITHOUT COMPLICATION (HCC): ICD-10-CM

## 2021-12-24 LAB
ALBUMIN SERPL BCP-MCNC: 4.5 G/DL (ref 3.2–4.9)
ALBUMIN/GLOB SERPL: 1.6 G/DL
ALP SERPL-CCNC: 132 U/L (ref 30–99)
ALT SERPL-CCNC: 67 U/L (ref 2–50)
ANION GAP SERPL CALC-SCNC: 16 MMOL/L (ref 7–16)
AST SERPL-CCNC: 50 U/L (ref 12–45)
BASOPHILS # BLD AUTO: 0.6 % (ref 0–1.8)
BASOPHILS # BLD: 0.04 K/UL (ref 0–0.12)
BILIRUB SERPL-MCNC: 0.7 MG/DL (ref 0.1–1.5)
BUN SERPL-MCNC: 10 MG/DL (ref 8–22)
CALCIUM SERPL-MCNC: 9.6 MG/DL (ref 8.5–10.5)
CHLORIDE SERPL-SCNC: 97 MMOL/L (ref 96–112)
CO2 SERPL-SCNC: 23 MMOL/L (ref 20–33)
CREAT SERPL-MCNC: 0.55 MG/DL (ref 0.5–1.4)
EOSINOPHIL # BLD AUTO: 0.1 K/UL (ref 0–0.51)
EOSINOPHIL NFR BLD: 1.6 % (ref 0–6.9)
ERYTHROCYTE [DISTWIDTH] IN BLOOD BY AUTOMATED COUNT: 44.4 FL (ref 35.9–50)
GLOBULIN SER CALC-MCNC: 2.8 G/DL (ref 1.9–3.5)
GLUCOSE SERPL-MCNC: 104 MG/DL (ref 65–99)
HCT VFR BLD AUTO: 40.4 % (ref 37–47)
HGB BLD-MCNC: 14.1 G/DL (ref 12–16)
IMM GRANULOCYTES # BLD AUTO: 0.02 K/UL (ref 0–0.11)
IMM GRANULOCYTES NFR BLD AUTO: 0.3 % (ref 0–0.9)
LYMPHOCYTES # BLD AUTO: 1.68 K/UL (ref 1–4.8)
LYMPHOCYTES NFR BLD: 26.9 % (ref 22–41)
MCH RBC QN AUTO: 30.3 PG (ref 27–33)
MCHC RBC AUTO-ENTMCNC: 34.9 G/DL (ref 33.6–35)
MCV RBC AUTO: 86.7 FL (ref 81.4–97.8)
MONOCYTES # BLD AUTO: 0.72 K/UL (ref 0–0.85)
MONOCYTES NFR BLD AUTO: 11.5 % (ref 0–13.4)
NEUTROPHILS # BLD AUTO: 3.68 K/UL (ref 2–7.15)
NEUTROPHILS NFR BLD: 59.1 % (ref 44–72)
NRBC # BLD AUTO: 0 K/UL
NRBC BLD-RTO: 0 /100 WBC
PLATELET # BLD AUTO: 252 K/UL (ref 164–446)
PMV BLD AUTO: 9.1 FL (ref 9–12.9)
POTASSIUM SERPL-SCNC: 2.9 MMOL/L (ref 3.6–5.5)
PROT SERPL-MCNC: 7.3 G/DL (ref 6–8.2)
RBC # BLD AUTO: 4.66 M/UL (ref 4.2–5.4)
SODIUM SERPL-SCNC: 136 MMOL/L (ref 135–145)
WBC # BLD AUTO: 6.2 K/UL (ref 4.8–10.8)

## 2021-12-24 PROCEDURE — 96374 THER/PROPH/DIAG INJ IV PUSH: CPT

## 2021-12-24 PROCEDURE — A9270 NON-COVERED ITEM OR SERVICE: HCPCS | Performed by: EMERGENCY MEDICINE

## 2021-12-24 PROCEDURE — 700111 HCHG RX REV CODE 636 W/ 250 OVERRIDE (IP): Performed by: EMERGENCY MEDICINE

## 2021-12-24 PROCEDURE — 700102 HCHG RX REV CODE 250 W/ 637 OVERRIDE(OP): Performed by: EMERGENCY MEDICINE

## 2021-12-24 PROCEDURE — 85025 COMPLETE CBC W/AUTO DIFF WBC: CPT

## 2021-12-24 PROCEDURE — 80053 COMPREHEN METABOLIC PANEL: CPT

## 2021-12-24 PROCEDURE — 700105 HCHG RX REV CODE 258: Performed by: EMERGENCY MEDICINE

## 2021-12-24 RX ORDER — LORAZEPAM 1 MG/1
1 TABLET ORAL EVERY 8 HOURS PRN
Qty: 12 TABLET | Refills: 0 | Status: SHIPPED | OUTPATIENT
Start: 2021-12-24 | End: 2021-12-27

## 2021-12-24 RX ORDER — SODIUM CHLORIDE 9 MG/ML
1000 INJECTION, SOLUTION INTRAVENOUS ONCE
Status: COMPLETED | OUTPATIENT
Start: 2021-12-24 | End: 2021-12-24

## 2021-12-24 RX ORDER — LORAZEPAM 2 MG/ML
2 INJECTION INTRAMUSCULAR ONCE
Status: COMPLETED | OUTPATIENT
Start: 2021-12-24 | End: 2021-12-24

## 2021-12-24 RX ORDER — LORAZEPAM 1 MG/1
1 TABLET ORAL ONCE
Status: COMPLETED | OUTPATIENT
Start: 2021-12-24 | End: 2021-12-24

## 2021-12-24 RX ORDER — POTASSIUM CHLORIDE 20 MEQ/1
40 TABLET, EXTENDED RELEASE ORAL DAILY
Status: DISCONTINUED | OUTPATIENT
Start: 2021-12-24 | End: 2021-12-24

## 2021-12-24 RX ORDER — POTASSIUM CHLORIDE 20 MEQ/1
40 TABLET, EXTENDED RELEASE ORAL ONCE
Status: COMPLETED | OUTPATIENT
Start: 2021-12-24 | End: 2021-12-24

## 2021-12-24 RX ORDER — IBUPROFEN 600 MG/1
600 TABLET ORAL ONCE
Status: COMPLETED | OUTPATIENT
Start: 2021-12-24 | End: 2021-12-24

## 2021-12-24 RX ADMIN — IBUPROFEN 600 MG: 600 TABLET, FILM COATED ORAL at 03:15

## 2021-12-24 RX ADMIN — LORAZEPAM 2 MG: 2 INJECTION INTRAMUSCULAR; INTRAVENOUS at 01:27

## 2021-12-24 RX ADMIN — POTASSIUM CHLORIDE 40 MEQ: 1500 TABLET, EXTENDED RELEASE ORAL at 03:14

## 2021-12-24 RX ADMIN — SODIUM CHLORIDE 1000 ML: 9 INJECTION, SOLUTION INTRAVENOUS at 01:29

## 2021-12-24 RX ADMIN — LORAZEPAM 1 MG: 1 TABLET ORAL at 03:14

## 2021-12-24 NOTE — ED NOTES
Pt rounded on for vitals pt informed staff that she was starting to have hallucinations and wanted to make sure we watched her because she wants to be safe. Informed pt to stay in eyes distance of staff

## 2021-12-24 NOTE — Clinical Note
Trevon Brennan was seen and treated in our emergency department on 12/23/2021.  She may return to work on 12/27/2021.       If you have any questions or concerns, please don't hesitate to call.      Oscar Doshi D.O.

## 2021-12-24 NOTE — ED NOTES
Pt to green 31 via wheel chair. Pt explains that she it trying to stop drinking alcohol and has now developed hallucinations. Pt is tremulous. Pt changed into gown and attached to monitoring equipment. Warm blanket provided.  Chart up for ERP.

## 2021-12-24 NOTE — ED TRIAGE NOTES
"Chief Complaint   Patient presents with   • ETOH Withdrawal     Pt BIBA from home for ETOH withdrawl. Pt AMA from here earlier this morning for same complaint.        Pt wheeled into triage. Pt tremulous in triage and reports last shot of vodka was a couple hours ago. Pt has history of hallucinations from detox and is requesting treatment while she is \"still clear headed.\"   Pt is AO x 4, follows commands, and responds appropriately to questions. Patient's breathing is unlabored and pain is currently 8/10 on the 0-10 pain scale.  Pt placed in lobby. Patient educated on triage process and encouraged to alert staff for any changes.    BP (!) 178/116   Pulse (!) 108   Temp 37.1 °C (98.7 °F) (Temporal)   Resp 18   Ht 1.575 m (5' 2\")   Wt 70.8 kg (156 lb)   SpO2 96%     "

## 2021-12-24 NOTE — ED NOTES
Called mtm to set up transport to [facility] pharmacy and then pt will call mtm for ride home. Confirmed [facility] open at 9am  Pt discharge home. Pt given discharge instructions and prescription sent to pharmacy. Pt verbalized understanding, all questions answered ,vss upon d/c. Pt steady on feet upon discharge

## 2021-12-24 NOTE — ED PROVIDER NOTES
"ED Provider Note    CHIEF COMPLAINT  Chief Complaint   Patient presents with   • ETOH Withdrawal     Pt BIBA from home for ETOH withdrawl. Pt AMA from here earlier this morning for same complaint.        HPI  Trevon Brennan is a 51 y.o. female here for evaluation of alcohol withdrawal.  The patient states she has been drinking for \"many years.\"  The pt states that she is having some shaking episodes today, and is here to 'get some help.'  She has no cp, no sob, no fever/chills.  No vomiting.      ROS;  Please see HPI  O/W negative     PAST MEDICAL HISTORY   has a past medical history of Bowel habit changes, Depression (2016), Heart burn, Indigestion, migraines, and Urinary incontinence.    SOCIAL HISTORY  Social History     Tobacco Use   • Smoking status: Never Smoker   • Smokeless tobacco: Never Used   Substance and Sexual Activity   • Alcohol use: Yes     Comment: 2 pints of vodka daily    • Drug use: Yes     Comment: occasional marajuna   • Sexual activity: Not on file       SURGICAL HISTORY   has a past surgical history that includes other abdominal surgery; other abdominal surgery; gastroscopy-endo (12/21/2011); ercp in or (11/18/2012); vaginal hysterectomy scope total (1/3/2017); anterior and posterior repair (1/3/2017); enterocele repair (1/3/2017); bladder sling female (1/3/2017); and vaginal suspension (1/3/2017).    CURRENT MEDICATIONS  Home Medications     Reviewed by Lili Corrales R.N. (Registered Nurse) on 12/23/21 at 2238  Med List Status: Partial   Medication Last Dose Status   calcium carbonate (TUMS E-X 750) 750 MG chewable tablet  Active   folic acid (FOLVITE) 1 MG Tab  Active   guaiFENesin (MUCINEX PO)  Active   multivitamin (THERAGRAN) Tab  Active   potassium chloride SA (KDUR) 20 MEQ Tab CR  Active                ALLERGIES  Allergies   Allergen Reactions   • Erythromycin Hives     Hives for a week       REVIEW OF SYSTEMS  See HPI for further details. Review of systems as above, " "otherwise all other systems are negative.     PHYSICAL EXAM  VITAL SIGNS: BP (!) 199/114   Pulse (!) 101   Temp 37.1 °C (98.7 °F) (Temporal)   Resp 17   Ht 1.575 m (5' 2\")   Wt 70.8 kg (156 lb)   LMP 03/01/2009   SpO2 97% Comment: RA  BMI 28.53 kg/m²     Constitutional: Well developed, well nourished. No acute distress.  HEENT: Normocephalic, atraumatic. MMM  Neck: Supple, Full range of motion   Chest/Pulmonary:  No respiratory distress.  Equal expansion   Musculoskeletal: No deformity, no edema, neurovascular intact.   Neuro: Clear speech, appropriate, cooperative, cranial nerves II-XII grossly intact. Mild tremor.   Psych: Normal mood and affect    Results for orders placed or performed during the hospital encounter of 12/24/21   CBC WITH DIFFERENTIAL   Result Value Ref Range    WBC 6.2 4.8 - 10.8 K/uL    RBC 4.66 4.20 - 5.40 M/uL    Hemoglobin 14.1 12.0 - 16.0 g/dL    Hematocrit 40.4 37.0 - 47.0 %    MCV 86.7 81.4 - 97.8 fL    MCH 30.3 27.0 - 33.0 pg    MCHC 34.9 33.6 - 35.0 g/dL    RDW 44.4 35.9 - 50.0 fL    Platelet Count 252 164 - 446 K/uL    MPV 9.1 9.0 - 12.9 fL    Neutrophils-Polys 59.10 44.00 - 72.00 %    Lymphocytes 26.90 22.00 - 41.00 %    Monocytes 11.50 0.00 - 13.40 %    Eosinophils 1.60 0.00 - 6.90 %    Basophils 0.60 0.00 - 1.80 %    Immature Granulocytes 0.30 0.00 - 0.90 %    Nucleated RBC 0.00 /100 WBC    Neutrophils (Absolute) 3.68 2.00 - 7.15 K/uL    Lymphs (Absolute) 1.68 1.00 - 4.80 K/uL    Monos (Absolute) 0.72 0.00 - 0.85 K/uL    Eos (Absolute) 0.10 0.00 - 0.51 K/uL    Baso (Absolute) 0.04 0.00 - 0.12 K/uL    Immature Granulocytes (abs) 0.02 0.00 - 0.11 K/uL    NRBC (Absolute) 0.00 K/uL   COMP METABOLIC PANEL   Result Value Ref Range    Sodium 136 135 - 145 mmol/L    Potassium 2.9 (L) 3.6 - 5.5 mmol/L    Chloride 97 96 - 112 mmol/L    Co2 23 20 - 33 mmol/L    Anion Gap 16.0 7.0 - 16.0    Glucose 104 (H) 65 - 99 mg/dL    Bun 10 8 - 22 mg/dL    Creatinine 0.55 0.50 - 1.40 mg/dL    " Calcium 9.6 8.5 - 10.5 mg/dL    AST(SGOT) 50 (H) 12 - 45 U/L    ALT(SGPT) 67 (H) 2 - 50 U/L    Alkaline Phosphatase 132 (H) 30 - 99 U/L    Total Bilirubin 0.7 0.1 - 1.5 mg/dL    Albumin 4.5 3.2 - 4.9 g/dL    Total Protein 7.3 6.0 - 8.2 g/dL    Globulin 2.8 1.9 - 3.5 g/dL    A-G Ratio 1.6 g/dL   ESTIMATED GFR   Result Value Ref Range    GFR If African American >60 >60 mL/min/1.73 m 2    GFR If Non African American >60 >60 mL/min/1.73 m 2   EKG   Result Value Ref Range    Report       [facility] Emergency Dept.    Test Date:  2021  Pt Name:    GATO QUINTANA                 Department: ER  MRN:        [id number]                      Room:  Gender:     Female                       Technician: [id number]  :        1970                   Requested By:ER TRIAGE PROTOCOL  Order #:    [id number]                    Reading MD:    Measurements  Intervals                                Axis  Rate:       104                          P:          0  NE:         208                          QRS:        -64  QRSD:       84                           T:          94  QT:         327  QTc:        431    Interpretive Statements  Sinus tachycardia  Prolonged NE interval  Consider left atrial enlargement  Left anterior fascicular block  Consider right ventricular hypertrophy  LVH with secondary repolarization abnormality  Compared to ECG 2021 13:39:29  First degree AV block now present  Early repolarization now present  Sinus rhythm no longer present  Left- axis deviation no longer present  ST (T wave) deviation no longer present       Ekg;  Sinus tach at 104.  No st elevation, no st depression, qtc 431.  Pr 208. Compared to ekg from 21.       PROCEDURES     MEDICAL RECORD  I have reviewed patient's medical record and pertinent results are listed.    COURSE & MEDICAL DECISION MAKING  I have reviewed any medical record information, laboratory studies and radiographic results as noted above.    2:49  AM  The pt has been up to the bathroom.  She is resting comfortably, has no tremors, no vomiting, and normal heart rate.  She will be discharged home, and we will have social work see for a ride home.      I you have had any blood pressure issues while here in the emergency department, please see your doctor for a further evaluation or work up.    Differential diagnoses include but not limited to: withdrawal.     This patient presents with alcohol withdrawl .  At this time, I have counseled the patient/family regarding their medications, pain control, and follow up.  They will continue their medications, if any, as prescribed.  They will return immediately for any worsening symptoms and/or any other medical concerns.  They will see their doctor, or contact the doctor provided, in 1-2 days for follow up.       FINAL IMPRESSION  Withdrawal, alcohol     Electronically signed by: Oscar Doshi D.O., 12/24/2021 1:18 AM

## 2021-12-24 NOTE — ED NOTES
IV established and pt medicated per MAR. IV would not aspirate blood. Phlebotomy requested to straight stick pt for labs.

## 2022-06-15 ENCOUNTER — PHARMACY VISIT (OUTPATIENT)
Dept: PHARMACY | Facility: MEDICAL CENTER | Age: 52
End: 2022-06-15
Payer: MEDICARE

## 2022-06-15 PROCEDURE — RXMED WILLOW AMBULATORY MEDICATION CHARGE: Performed by: NURSE PRACTITIONER

## 2022-06-15 RX ORDER — ONDANSETRON HYDROCHLORIDE 8 MG/1
TABLET, FILM COATED ORAL
Qty: 6 TABLET | Refills: 0 | OUTPATIENT
Start: 2022-06-15

## 2022-06-20 PROCEDURE — RXMED WILLOW AMBULATORY MEDICATION CHARGE: Performed by: NURSE PRACTITIONER

## 2022-06-21 ENCOUNTER — PHARMACY VISIT (OUTPATIENT)
Dept: PHARMACY | Facility: MEDICAL CENTER | Age: 52
End: 2022-06-21
Payer: MEDICARE

## 2022-06-23 ENCOUNTER — PHARMACY VISIT (OUTPATIENT)
Dept: PHARMACY | Facility: MEDICAL CENTER | Age: 52
End: 2022-06-23
Payer: MEDICARE

## 2022-06-23 PROCEDURE — RXMED WILLOW AMBULATORY MEDICATION CHARGE: Performed by: NURSE PRACTITIONER

## 2022-06-23 RX ORDER — FLUTICASONE PROPIONATE 50 MCG
SPRAY, SUSPENSION (ML) NASAL
Qty: 15.8 ML | Refills: 0 | OUTPATIENT
Start: 2022-06-22

## 2022-06-28 ENCOUNTER — PHARMACY VISIT (OUTPATIENT)
Dept: PHARMACY | Facility: MEDICAL CENTER | Age: 52
End: 2022-06-28
Payer: MEDICARE

## 2022-06-28 PROCEDURE — RXMED WILLOW AMBULATORY MEDICATION CHARGE: Performed by: NURSE PRACTITIONER

## 2022-06-28 RX ORDER — NALTREXONE HYDROCHLORIDE 50 MG/1
50 TABLET, FILM COATED ORAL EVERY MORNING
Qty: 30 TABLET | Refills: 0 | OUTPATIENT
Start: 2022-06-27

## 2022-07-01 ENCOUNTER — PHARMACY VISIT (OUTPATIENT)
Dept: PHARMACY | Facility: MEDICAL CENTER | Age: 52
End: 2022-07-01
Payer: MEDICARE

## 2022-07-01 PROCEDURE — RXMED WILLOW AMBULATORY MEDICATION CHARGE: Performed by: NURSE PRACTITIONER

## 2022-07-01 RX ORDER — CARIPRAZINE 4.5 MG/1
1 CAPSULE, GELATIN COATED ORAL
Qty: 30 CAPSULE | Refills: 0 | OUTPATIENT
Start: 2022-07-01

## 2022-07-01 RX ORDER — PRAZOSIN HYDROCHLORIDE 1 MG/1
1 CAPSULE ORAL
Qty: 30 CAPSULE | Refills: 0 | OUTPATIENT
Start: 2022-07-01

## 2022-07-08 ENCOUNTER — PHARMACY VISIT (OUTPATIENT)
Dept: PHARMACY | Facility: MEDICAL CENTER | Age: 52
End: 2022-07-08
Payer: MEDICARE

## 2022-07-08 PROCEDURE — RXMED WILLOW AMBULATORY MEDICATION CHARGE: Performed by: NURSE PRACTITIONER

## 2022-07-08 RX ORDER — AMOXICILLIN AND CLAVULANATE POTASSIUM 875; 125 MG/1; MG/1
1 TABLET, FILM COATED ORAL 2 TIMES DAILY
Qty: 14 TABLET | Refills: 0 | Status: SHIPPED | OUTPATIENT
Start: 2022-07-08 | End: 2023-04-10

## 2022-07-11 ENCOUNTER — HOSPITAL ENCOUNTER (EMERGENCY)
Facility: MEDICAL CENTER | Age: 52
End: 2022-07-11
Attending: EMERGENCY MEDICINE
Payer: COMMERCIAL

## 2022-07-11 ENCOUNTER — APPOINTMENT (OUTPATIENT)
Dept: RADIOLOGY | Facility: MEDICAL CENTER | Age: 52
End: 2022-07-11
Attending: EMERGENCY MEDICINE
Payer: COMMERCIAL

## 2022-07-11 VITALS
SYSTOLIC BLOOD PRESSURE: 154 MMHG | OXYGEN SATURATION: 98 % | BODY MASS INDEX: 25.64 KG/M2 | TEMPERATURE: 97.5 F | DIASTOLIC BLOOD PRESSURE: 81 MMHG | HEIGHT: 62 IN | HEART RATE: 71 BPM | RESPIRATION RATE: 16 BRPM | WEIGHT: 139.33 LBS

## 2022-07-11 DIAGNOSIS — R10.84 GENERALIZED ABDOMINAL PAIN: ICD-10-CM

## 2022-07-11 DIAGNOSIS — K59.00 CONSTIPATION, UNSPECIFIED CONSTIPATION TYPE: ICD-10-CM

## 2022-07-11 LAB
ALBUMIN SERPL BCP-MCNC: 5 G/DL (ref 3.2–4.9)
ALBUMIN/GLOB SERPL: 2 G/DL
ALP SERPL-CCNC: 89 U/L (ref 30–99)
ALT SERPL-CCNC: 18 U/L (ref 2–50)
ANION GAP SERPL CALC-SCNC: 12 MMOL/L (ref 7–16)
APPEARANCE UR: CLEAR
AST SERPL-CCNC: 23 U/L (ref 12–45)
BASOPHILS # BLD AUTO: 0.6 % (ref 0–1.8)
BASOPHILS # BLD: 0.04 K/UL (ref 0–0.12)
BILIRUB SERPL-MCNC: 0.4 MG/DL (ref 0.1–1.5)
BILIRUB UR QL STRIP.AUTO: NEGATIVE
BUN SERPL-MCNC: 6 MG/DL (ref 8–22)
CALCIUM SERPL-MCNC: 10.2 MG/DL (ref 8.5–10.5)
CHLORIDE SERPL-SCNC: 102 MMOL/L (ref 96–112)
CO2 SERPL-SCNC: 24 MMOL/L (ref 20–33)
COLOR UR: YELLOW
CREAT SERPL-MCNC: 0.56 MG/DL (ref 0.5–1.4)
EOSINOPHIL # BLD AUTO: 0.09 K/UL (ref 0–0.51)
EOSINOPHIL NFR BLD: 1.4 % (ref 0–6.9)
ERYTHROCYTE [DISTWIDTH] IN BLOOD BY AUTOMATED COUNT: 41.8 FL (ref 35.9–50)
GFR SERPLBLD CREATININE-BSD FMLA CKD-EPI: 108 ML/MIN/1.73 M 2
GLOBULIN SER CALC-MCNC: 2.5 G/DL (ref 1.9–3.5)
GLUCOSE SERPL-MCNC: 106 MG/DL (ref 65–99)
GLUCOSE UR STRIP.AUTO-MCNC: NEGATIVE MG/DL
HCT VFR BLD AUTO: 41.7 % (ref 37–47)
HGB BLD-MCNC: 14.2 G/DL (ref 12–16)
IMM GRANULOCYTES # BLD AUTO: 0.02 K/UL (ref 0–0.11)
IMM GRANULOCYTES NFR BLD AUTO: 0.3 % (ref 0–0.9)
KETONES UR STRIP.AUTO-MCNC: NEGATIVE MG/DL
LEUKOCYTE ESTERASE UR QL STRIP.AUTO: NEGATIVE
LIPASE SERPL-CCNC: 17 U/L (ref 11–82)
LYMPHOCYTES # BLD AUTO: 1.29 K/UL (ref 1–4.8)
LYMPHOCYTES NFR BLD: 20.3 % (ref 22–41)
MCH RBC QN AUTO: 28.5 PG (ref 27–33)
MCHC RBC AUTO-ENTMCNC: 34.1 G/DL (ref 33.6–35)
MCV RBC AUTO: 83.6 FL (ref 81.4–97.8)
MICRO URNS: NORMAL
MONOCYTES # BLD AUTO: 0.47 K/UL (ref 0–0.85)
MONOCYTES NFR BLD AUTO: 7.4 % (ref 0–13.4)
NEUTROPHILS # BLD AUTO: 4.46 K/UL (ref 2–7.15)
NEUTROPHILS NFR BLD: 70 % (ref 44–72)
NITRITE UR QL STRIP.AUTO: NEGATIVE
NRBC # BLD AUTO: 0 K/UL
NRBC BLD-RTO: 0 /100 WBC
PH UR STRIP.AUTO: 7 [PH] (ref 5–8)
PLATELET # BLD AUTO: 267 K/UL (ref 164–446)
PMV BLD AUTO: 8.8 FL (ref 9–12.9)
POTASSIUM SERPL-SCNC: 3.8 MMOL/L (ref 3.6–5.5)
PROT SERPL-MCNC: 7.5 G/DL (ref 6–8.2)
PROT UR QL STRIP: NEGATIVE MG/DL
RBC # BLD AUTO: 4.99 M/UL (ref 4.2–5.4)
RBC UR QL AUTO: NEGATIVE
SODIUM SERPL-SCNC: 138 MMOL/L (ref 135–145)
SP GR UR STRIP.AUTO: 1
UROBILINOGEN UR STRIP.AUTO-MCNC: 0.2 MG/DL
WBC # BLD AUTO: 6.4 K/UL (ref 4.8–10.8)

## 2022-07-11 PROCEDURE — 700111 HCHG RX REV CODE 636 W/ 250 OVERRIDE (IP): Performed by: EMERGENCY MEDICINE

## 2022-07-11 PROCEDURE — 81003 URINALYSIS AUTO W/O SCOPE: CPT

## 2022-07-11 PROCEDURE — 700117 HCHG RX CONTRAST REV CODE 255: Performed by: EMERGENCY MEDICINE

## 2022-07-11 PROCEDURE — 85025 COMPLETE CBC W/AUTO DIFF WBC: CPT

## 2022-07-11 PROCEDURE — 74177 CT ABD & PELVIS W/CONTRAST: CPT

## 2022-07-11 PROCEDURE — 700105 HCHG RX REV CODE 258: Performed by: EMERGENCY MEDICINE

## 2022-07-11 PROCEDURE — 80053 COMPREHEN METABOLIC PANEL: CPT

## 2022-07-11 PROCEDURE — 96374 THER/PROPH/DIAG INJ IV PUSH: CPT

## 2022-07-11 PROCEDURE — 83690 ASSAY OF LIPASE: CPT

## 2022-07-11 PROCEDURE — 99284 EMERGENCY DEPT VISIT MOD MDM: CPT

## 2022-07-11 PROCEDURE — 36415 COLL VENOUS BLD VENIPUNCTURE: CPT

## 2022-07-11 PROCEDURE — 96375 TX/PRO/DX INJ NEW DRUG ADDON: CPT

## 2022-07-11 RX ORDER — ONDANSETRON 2 MG/ML
4 INJECTION INTRAMUSCULAR; INTRAVENOUS ONCE
Status: COMPLETED | OUTPATIENT
Start: 2022-07-11 | End: 2022-07-11

## 2022-07-11 RX ORDER — MORPHINE SULFATE 4 MG/ML
4 INJECTION INTRAVENOUS ONCE
Status: COMPLETED | OUTPATIENT
Start: 2022-07-11 | End: 2022-07-11

## 2022-07-11 RX ORDER — POLYETHYLENE GLYCOL 3350 17 G/17G
17 POWDER, FOR SOLUTION ORAL
Qty: 510 G | Refills: 0 | Status: SHIPPED | OUTPATIENT
Start: 2022-07-11

## 2022-07-11 RX ORDER — SODIUM CHLORIDE 9 MG/ML
1000 INJECTION, SOLUTION INTRAVENOUS ONCE
Status: COMPLETED | OUTPATIENT
Start: 2022-07-11 | End: 2022-07-11

## 2022-07-11 RX ADMIN — IOHEXOL 75 ML: 350 INJECTION, SOLUTION INTRAVENOUS at 17:00

## 2022-07-11 RX ADMIN — MORPHINE SULFATE 4 MG: 4 INJECTION INTRAVENOUS at 15:27

## 2022-07-11 RX ADMIN — SODIUM CHLORIDE 1000 ML: 9 INJECTION, SOLUTION INTRAVENOUS at 15:27

## 2022-07-11 RX ADMIN — ONDANSETRON 4 MG: 2 INJECTION INTRAMUSCULAR; INTRAVENOUS at 15:27

## 2022-07-11 ASSESSMENT — ENCOUNTER SYMPTOMS
BACK PAIN: 0
SHORTNESS OF BREATH: 0
NECK PAIN: 0
FEVER: 0
ABDOMINAL PAIN: 1
NAUSEA: 1
SORE THROAT: 0
COUGH: 0
SEIZURES: 0
VOMITING: 1
FOCAL WEAKNESS: 0
EYE REDNESS: 0
BLURRED VISION: 0
CHILLS: 0
HEADACHES: 0

## 2022-07-11 ASSESSMENT — FIBROSIS 4 INDEX: FIB4 SCORE: 1.28

## 2022-07-11 NOTE — ED TRIAGE NOTES
Vitals:    07/11/22 1126   BP: (!) 161/92   Pulse: 91   Resp: 18   Temp: 36.3 °C (97.4 °F)   SpO2: 98%     Chief Complaint   Patient presents with   • Sent by MD   • Constipation   • Abdominal Pain     Pt has not had a bowel movement for 7 days. She was seen by her PCP who directed her to the ED for evaluation. Pt has been taking miralax without relief. Pt is not passing gas. No hx of bowel obstructions but has family hx of.     Pt is ambulatory to and from triage and is alert and oriented x 4.

## 2022-07-11 NOTE — Clinical Note
Trevon Brennan was seen and treated in our emergency department on 7/11/2022.  She may return to work on 07/12/2022.  Patient was in the emergency department from about noon till 6 PM this evening.  She was treated with a dose of opiate pain medication for constipation as well as antinausea medicine and fluids.  Please excuse her from activities tonight to rest.  She can use MiraLAX daily as needed for ongoing constipation.     If you have any questions or concerns, please don't hesitate to call.      Trixie Medellin M.D.

## 2022-07-12 NOTE — DISCHARGE INSTRUCTIONS
You were seen in the Emergency Department for abdominal pain, constipation.    Labs, CT scan were completed without significant acute abnormalities.    Please use 1,000mg of tylenol or 600mg of ibuprofen every 6 hours as needed for pain.  Use miralax daily for ongoing constipation.    Please follow up with your primary care physician.    Return to the Emergency Department with worsening abdominal pain, vomiting, fevers, or other concerns.

## 2022-08-05 PROCEDURE — RXMED WILLOW AMBULATORY MEDICATION CHARGE: Performed by: NURSE PRACTITIONER

## 2022-08-08 ENCOUNTER — PHARMACY VISIT (OUTPATIENT)
Dept: PHARMACY | Facility: MEDICAL CENTER | Age: 52
End: 2022-08-08
Payer: MEDICARE

## 2023-04-04 ENCOUNTER — OFFICE VISIT (OUTPATIENT)
Dept: URGENT CARE | Facility: CLINIC | Age: 53
End: 2023-04-04
Payer: COMMERCIAL

## 2023-04-04 VITALS
RESPIRATION RATE: 16 BRPM | BODY MASS INDEX: 27.6 KG/M2 | TEMPERATURE: 98.2 F | WEIGHT: 150 LBS | SYSTOLIC BLOOD PRESSURE: 132 MMHG | OXYGEN SATURATION: 95 % | HEIGHT: 62 IN | HEART RATE: 76 BPM | DIASTOLIC BLOOD PRESSURE: 82 MMHG

## 2023-04-04 DIAGNOSIS — N12 PYELONEPHRITIS: ICD-10-CM

## 2023-04-04 DIAGNOSIS — R30.0 DYSURIA: ICD-10-CM

## 2023-04-04 LAB
APPEARANCE UR: CLEAR
BILIRUB UR STRIP-MCNC: NEGATIVE MG/DL
COLOR UR AUTO: YELLOW
GLUCOSE UR STRIP.AUTO-MCNC: NEGATIVE MG/DL
KETONES UR STRIP.AUTO-MCNC: NEGATIVE MG/DL
LEUKOCYTE ESTERASE UR QL STRIP.AUTO: NORMAL
NITRITE UR QL STRIP.AUTO: NEGATIVE
PH UR STRIP.AUTO: 6.5 [PH] (ref 5–8)
PROT UR QL STRIP: NEGATIVE MG/DL
RBC UR QL AUTO: NORMAL
SP GR UR STRIP.AUTO: 1.01
UROBILINOGEN UR STRIP-MCNC: 0.2 MG/DL

## 2023-04-04 PROCEDURE — 99203 OFFICE O/P NEW LOW 30 MIN: CPT | Performed by: PHYSICIAN ASSISTANT

## 2023-04-04 PROCEDURE — 81002 URINALYSIS NONAUTO W/O SCOPE: CPT | Performed by: PHYSICIAN ASSISTANT

## 2023-04-04 RX ORDER — CIPROFLOXACIN 500 MG/1
500 TABLET, FILM COATED ORAL EVERY 12 HOURS
Qty: 14 TABLET | Refills: 0 | Status: SHIPPED | OUTPATIENT
Start: 2023-04-04 | End: 2023-04-11

## 2023-04-04 ASSESSMENT — ENCOUNTER SYMPTOMS
CHILLS: 1
ABDOMINAL PAIN: 1
DIARRHEA: 0
VOMITING: 0
FEVER: 1
NAUSEA: 1
FLANK PAIN: 1

## 2023-04-04 ASSESSMENT — FIBROSIS 4 INDEX: FIB4 SCORE: 1.1

## 2023-04-04 NOTE — LETTER
MARIA R  [facility]  [address] [facility] [address]     April 4, 2023    Patient: Trevon Brennan   YOB: 1970   Date of Visit: 4/4/2023       To Whom It May Concern:    Trevon Brennan was seen and treated in our department on 4/4/2023.  She should be excused from work for today.    Sincerely,     Chidi Bhakta P.A.-C.

## 2023-04-04 NOTE — PROGRESS NOTES
Subjective:     Trevon Brennan  is a 52 y.o. female who presents for Dysuria      Dysuria   This is a new problem. The current episode started in the past 7 days. Associated symptoms include chills, flank pain, frequency, nausea and urgency. Pertinent negatives include no hematuria or vomiting.     Patient presents urgent care complaining of symptoms concerning for urinary tract infection.  She notes some mild dysuria that is more achy in nature, complains of frequency of urination and incomplete voiding.  She notes some slight malodorous nature of urine.  Subjective fever and chills.  She complains of nausea without vomiting.  She endorses some left mild flank pain.  Patient notes remote history of pyelonephritis.  Denies history of hospitalization with urinary tract infection.  Has tried no treatments over the last 1 week of symptoms.  Denies abnormal vaginal discharge.  She complains of mild suprapubic discomfort.    Review of Systems   Constitutional:  Positive for chills and fever (subj).   Gastrointestinal:  Positive for abdominal pain (suprapubic) and nausea. Negative for diarrhea and vomiting.   Genitourinary:  Positive for dysuria, flank pain, frequency and urgency. Negative for hematuria.   Skin:  Negative for rash.     Medications:    amoxicillin-clavulanate Tabs  fluticasone  folic acid Tabs  gabapentin Caps  MUCINEX PO  multivitamin Tabs  naltrexone Tabs  ondansetron Tabs  polyethylene glycol 3350 Powd  potassium chloride SA Tbcr  prazosin Caps  QUEtiapine Tabs  Tums E-X 750 Chew  Vraylar Caps    Allergies: Erythromycin    Problem List: Trevon Brennan does not have any pertinent problems on file.    Surgical History:  Past Surgical History:   Procedure Laterality Date    VAGINAL HYSTERECTOMY SCOPE TOTAL  1/3/2017    Procedure: VAGINAL HYSTERECTOMY SCOPE TOTAL W/BILATERAL SALPINGECTOMY;  Surgeon: Roel Sears M.D.;  Location: SURGERY SAME DAY [facility];  Service:     ANTERIOR AND  "POSTERIOR REPAIR  1/3/2017    Procedure: ANTERIOR AND POSTERIOR REPAIR;  Surgeon: Roel Sears M.D.;  Location: SURGERY SAME DAY [facility];  Service:     ENTEROCELE REPAIR  1/3/2017    Procedure: ENTEROCELE REPAIR With PERINEOPLASTY;  Surgeon: Roel Sears M.D.;  Location: SURGERY SAME DAY [facility];  Service:     BLADDER SLING FEMALE  1/3/2017    Procedure: BLADDER SLING FEMALE - TOT;  Surgeon: Roel Sears M.D.;  Location: SURGERY SAME DAY [facility];  Service:     VAGINAL SUSPENSION  1/3/2017    Procedure: SACROSPINOUS VAULT VAGINAL SUSPENSION ;  Surgeon: Roel Sears M.D.;  Location: SURGERY SAME DAY [facility];  Service:     ERCP IN OR  11/18/2012    Performed by Junior Davenport M.D. at SURGERY [facility]    GASTROSCOPY-ENDO  12/21/2011    Performed by DELFINO MICHELE at ENDOSCOPY [facility]    OTHER ABDOMINAL SURGERY      choley    OTHER ABDOMINAL SURGERY      appy       Past Social Hx: Trevon Brennan  reports that she has never smoked. She has never used smokeless tobacco. She reports that she does not currently use alcohol. She reports that she does not currently use drugs.     Past Family Hx:  Trevon Brennan family history is not on file.     Problem list, medications, and allergies reviewed by myself today in Epic.     Objective:   /82 (BP Location: Right arm, Patient Position: Sitting, BP Cuff Size: Adult long)   Pulse 76   Temp 36.8 °C (98.2 °F) (Temporal)   Resp 16   Ht 1.575 m (5' 2\")   Wt 68 kg (150 lb)   LMP 03/01/2009   SpO2 95%   BMI 27.44 kg/m²     Physical Exam  Vitals and nursing note reviewed.   Constitutional:       General: She is not in acute distress.     Appearance: Normal appearance. She is well-developed. She is not diaphoretic.   HENT:      Head: Normocephalic and atraumatic.      Right Ear: External ear normal.      Left Ear: External ear normal.      Nose: Nose normal.   Eyes:      General: Lids are normal. No scleral " icterus.        Right eye: No discharge.         Left eye: No discharge.      Conjunctiva/sclera: Conjunctivae normal.   Pulmonary:      Effort: Pulmonary effort is normal. No respiratory distress.   Abdominal:      Palpations: Abdomen is soft. Abdomen is not rigid.      Tenderness: There is abdominal tenderness ( very mild suprapubic) in the suprapubic area. There is left CVA tenderness (mild). There is no right CVA tenderness or guarding.   Musculoskeletal:         General: Normal range of motion.      Cervical back: Neck supple.   Skin:     General: Skin is warm and dry.      Coloration: Skin is not pale.      Findings: No erythema.   Neurological:      Mental Status: She is alert and oriented to person, place, and time. She is not disoriented.   Psychiatric:         Speech: Speech normal.         Behavior: Behavior normal.   POCT UA - ++ (see chart)    Rocephin 500 mg IM-tolerates well  Assessment/Plan:   Assessment      1. Pyelonephritis  - ciprofloxacin (CIPRO) 500 MG Tab; Take 1 Tablet by mouth every 12 hours for 7 days.  Dispense: 14 Tablet; Refill: 0  - cefTRIAXone (ROCEPHIN) 500 mg, lidocaine (XYLOCAINE) 1 % 1.8 mL for IM use    2. Dysuria  - POCT Urinalysis  Supportive care is reviewed with patient/caregiver - recommend to push PO fluids and electrolytes, nsaids/tylenol, rest, full course of abx, probiotics w/ abx, azo/cran, observe for resolution  Return to clinic with lack of resolution or progression of symptoms.  ER precautions with any worsening symptoms are reviewed with patient/caregiver and they do express understanding      I have worn an N95 mask, gloves and eye protection for the entire encounter with this patient.     Differential diagnosis, natural history, supportive care, and indications for immediate follow-up discussed.

## 2023-04-04 NOTE — LETTER
MARIA R  [facility] [place] [id number] [facility] [address]     April 4, 2023    Patient: Trevon Brennan   YOB: 1970   Date of Visit: 4/4/2023       To Whom It May Concern:    Trevon Brennan was seen and treated in our department on 4/4/2023.     Sincerely,     Chidi Bhakta P.A.-C.

## 2023-04-10 ENCOUNTER — OFFICE VISIT (OUTPATIENT)
Dept: URGENT CARE | Facility: CLINIC | Age: 53
End: 2023-04-10
Payer: COMMERCIAL

## 2023-04-10 VITALS
DIASTOLIC BLOOD PRESSURE: 64 MMHG | OXYGEN SATURATION: 97 % | HEART RATE: 95 BPM | HEIGHT: 62 IN | RESPIRATION RATE: 16 BRPM | BODY MASS INDEX: 29.63 KG/M2 | SYSTOLIC BLOOD PRESSURE: 112 MMHG | WEIGHT: 161 LBS | TEMPERATURE: 99.5 F

## 2023-04-10 DIAGNOSIS — Z86.19 HISTORY OF CANDIDAL VULVOVAGINITIS: ICD-10-CM

## 2023-04-10 DIAGNOSIS — J01.40 ACUTE NON-RECURRENT PANSINUSITIS: ICD-10-CM

## 2023-04-10 PROCEDURE — 99214 OFFICE O/P EST MOD 30 MIN: CPT | Performed by: NURSE PRACTITIONER

## 2023-04-10 RX ORDER — FLUCONAZOLE 150 MG/1
150 TABLET ORAL ONCE
Qty: 1 TABLET | Refills: 0 | Status: SHIPPED | OUTPATIENT
Start: 2023-04-10 | End: 2023-04-10

## 2023-04-10 RX ORDER — METHYLPREDNISOLONE 4 MG/1
TABLET ORAL
Qty: 1 EACH | Refills: 0 | Status: SHIPPED | OUTPATIENT
Start: 2023-04-10

## 2023-04-10 RX ORDER — TRAZODONE HYDROCHLORIDE 100 MG/1
200 TABLET ORAL NIGHTLY
COMMUNITY

## 2023-04-10 RX ORDER — AMOXICILLIN AND CLAVULANATE POTASSIUM 875; 125 MG/1; MG/1
1 TABLET, FILM COATED ORAL 2 TIMES DAILY
Qty: 14 TABLET | Refills: 0 | Status: SHIPPED | OUTPATIENT
Start: 2023-04-10 | End: 2023-04-17

## 2023-04-10 ASSESSMENT — ENCOUNTER SYMPTOMS
WEAKNESS: 0
COUGH: 1
SINUS PRESSURE: 1
HEADACHES: 1
DIZZINESS: 1
SINUS PAIN: 1
SHORTNESS OF BREATH: 0
FEVER: 1
SENSORY CHANGE: 0

## 2023-04-10 ASSESSMENT — FIBROSIS 4 INDEX: FIB4 SCORE: 1.1

## 2023-04-10 ASSESSMENT — VISUAL ACUITY: OU: 1

## 2023-04-10 NOTE — PROGRESS NOTES
Subjective:     Trevon Brennan is a 52 y.o. female who presents for Sinus Problem (Pt states possible sinus infection )       Sinus Problem  This is a new problem. Episode onset: 5 days. The problem has been gradually worsening since onset. Associated symptoms include congestion (Green mucus), coughing, ear pain (Left), headaches and sinus pressure. Pertinent negatives include no shortness of breath.     Tx: ibuprofen/Flonase with no relief.    Review of Systems   Constitutional:  Positive for fever (101).   HENT:  Positive for congestion (Green mucus), ear pain (Left), sinus pressure and sinus pain (And TTP).    Respiratory:  Positive for cough. Negative for shortness of breath.    Neurological:  Positive for dizziness and headaches. Negative for sensory change and weakness.   All other systems reviewed and are negative.    Refer to HPI for additional details.    During this visit, appropriate PPE was worn, hand hygiene was performed, and the patient and any visitors were masked.    PMH:  has a past medical history of Bowel habit changes, Depression (2016), Heart burn, Indigestion, migraines, and Urinary incontinence.    MEDS:   Current Outpatient Medications:     traZODone (DESYREL) 100 MG Tab, Take 200 mg by mouth every evening., Disp: , Rfl:     amoxicillin-clavulanate (AUGMENTIN) 875-125 MG Tab, Take 1 Tablet by mouth 2 times a day for 7 days., Disp: 14 Tablet, Rfl: 0    methylPREDNISolone (MEDROL DOSEPAK) 4 MG Tablet Therapy Pack, Use as directed on package. May take all of Day 1 as a single dose (24 mg) when starting., Disp: 1 Each, Rfl: 0    fluconazole (DIFLUCAN) 150 MG tablet, Take 1 Tablet by mouth one time for 1 dose., Disp: 1 Tablet, Rfl: 0    polyethylene glycol 3350 (MIRALAX) 17 GM/SCOOP Powder, Take 17 g by mouth 1 time a day as needed (constipation)., Disp: 510 g, Rfl: 0    prazosin (MINIPRESS) 1 MG Cap, Take 1 capsule by mouth at bedtime, Disp: 30 Capsule, Rfl: 0    naltrexone (DEPADE) 50 MG  Tab, Take 1 Tablet by mouth every morning at 9am., Disp: 30 Tablet, Rfl: 0    fluticasone (FLONASE ALLERGY RELIEF) 50 MCG/ACT nasal spray, Spray 1 spray into both nostrils once a day, Disp: 15.8 mL, Rfl: 0    gabapentin (NEURONTIN) 300 MG Cap, Take 1 capsule by mouth three times a day, Disp: 90 Capsule, Rfl: 0    ondansetron (ZOFRAN) 8 MG Tab, Take 1 tablet by mouth every 8 hours. Do not take with your escitalopram, Disp: 6 Tablet, Rfl: 0    multivitamin (THERAGRAN) Tab, Take 1 Tablet by mouth every day., Disp: 30 Tablet, Rfl: 1    guaiFENesin (MUCINEX PO), Take 1 Tablet by mouth 2 times a day as needed (Phlegm)., Disp: , Rfl:     calcium carbonate (TUMS E-X 750) 750 MG chewable tablet, Chew 3-4 Tablets at bedtime as needed (Gastroesophageal Reflux)., Disp: , Rfl:     ciprofloxacin (CIPRO) 500 MG Tab, Take 1 Tablet by mouth every 12 hours for 7 days. (Patient not taking: Reported on 4/10/2023), Disp: 14 Tablet, Rfl: 0    Cariprazine HCl (VRAYLAR) 4.5 MG Cap, Take 1 capsule by mouth at bedtime (Patient not taking: Reported on 4/10/2023), Disp: 30 Capsule, Rfl: 0    QUEtiapine (SEROQUEL) 100 MG Tab, Take 1 tablet by mouth at bedtime (Patient not taking: Reported on 4/10/2023), Disp: 30 Tablet, Rfl: 0    potassium chloride SA (KDUR) 20 MEQ Tab CR, Take 1 Tablet by mouth every day. (Patient not taking: Reported on 4/10/2023), Disp: 30 Tablet, Rfl: 0    folic acid (FOLVITE) 1 MG Tab, Take 1 Tablet by mouth every day. (Patient not taking: Reported on 4/10/2023), Disp: 30 Tablet, Rfl: 1    ALLERGIES:   Allergies   Allergen Reactions    Erythromycin Hives     Hives for a week     SURGHX:   Past Surgical History:   Procedure Laterality Date    VAGINAL HYSTERECTOMY SCOPE TOTAL  1/3/2017    Procedure: VAGINAL HYSTERECTOMY SCOPE TOTAL W/BILATERAL SALPINGECTOMY;  Surgeon: Roel Sears M.D.;  Location: SURGERY SAME DAY [facility];  Service:     ANTERIOR AND POSTERIOR REPAIR  1/3/2017    Procedure: ANTERIOR AND POSTERIOR  "REPAIR;  Surgeon: Roel Sears M.D.;  Location: SURGERY SAME DAY [facility] ORS;  Service:     ENTEROCELE REPAIR  1/3/2017    Procedure: ENTEROCELE REPAIR With PERINEOPLASTY;  Surgeon: Roel Sears M.D.;  Location: SURGERY SAME DAY [facility] ORS;  Service:     BLADDER SLING FEMALE  1/3/2017    Procedure: BLADDER SLING FEMALE - TOT;  Surgeon: Roel Sears M.D.;  Location: SURGERY SAME DAY [facility] ORS;  Service:     VAGINAL SUSPENSION  1/3/2017    Procedure: SACROSPINOUS VAULT VAGINAL SUSPENSION ;  Surgeon: Roel Sears M.D.;  Location: SURGERY SAME DAY [facility] ORS;  Service:     ERCP IN OR  11/18/2012    Performed by Junior Davenport M.D. at SURGERY [facility]    GASTROSCOPY-ENDO  12/21/2011    Performed by DELFINO MICHELE at ENDOSCOPY [facility] ORS    OTHER ABDOMINAL SURGERY      choley    OTHER ABDOMINAL SURGERY      appy     SOCHX:  reports that she has never smoked. She has never used smokeless tobacco. She reports that she does not currently use alcohol. She reports that she does not currently use drugs.    FH: Per HPI as applicable/pertinent.      Objective:     /64   Pulse 95   Temp 37.5 °C (99.5 °F)   Resp 16   Ht 1.575 m (5' 2\")   Wt 73 kg (161 lb)   LMP 03/01/2009   SpO2 97%   BMI 29.45 kg/m²     Physical Exam  Nursing note reviewed.   Constitutional:       General: She is not in acute distress.     Appearance: She is well-developed. She is ill-appearing. She is not toxic-appearing.   HENT:      Right Ear: Tympanic membrane normal.      Left Ear: Tympanic membrane normal.      Nose: Congestion and rhinorrhea present.      Right Sinus: Maxillary sinus tenderness and frontal sinus tenderness present.      Left Sinus: Maxillary sinus tenderness and frontal sinus tenderness present.      Mouth/Throat:      Mouth: Mucous membranes are moist.      Pharynx: Oropharynx is clear.   Eyes:      General: Vision grossly intact.   Neck:      Trachea: Phonation normal. "   Cardiovascular:      Rate and Rhythm: Normal rate.   Pulmonary:      Effort: Pulmonary effort is normal. No respiratory distress.   Musculoskeletal:         General: No deformity. Normal range of motion.   Skin:     General: Skin is warm and dry.      Coloration: Skin is not pale.   Neurological:      Mental Status: She is alert and oriented to person, place, and time.      Motor: No weakness.   Psychiatric:         Behavior: Behavior normal. Behavior is cooperative.       Assessment/Plan:     1. Acute non-recurrent pansinusitis  - amoxicillin-clavulanate (AUGMENTIN) 875-125 MG Tab; Take 1 Tablet by mouth 2 times a day for 7 days.  Dispense: 14 Tablet; Refill: 0  - methylPREDNISolone (MEDROL DOSEPAK) 4 MG Tablet Therapy Pack; Use as directed on package. May take all of Day 1 as a single dose (24 mg) when starting.  Dispense: 1 Each; Refill: 0    2. History of candidal vulvovaginitis  - fluconazole (DIFLUCAN) 150 MG tablet; Take 1 Tablet by mouth one time for 1 dose.  Dispense: 1 Tablet; Refill: 0    Rx as above sent electronically.     Differential diagnosis, natural history, supportive care, over-the-counter symptom management per 's instructions, close monitoring, and indications for immediate follow-up discussed.     All questions answered. Patient agrees with the plan of care.    Discharge summary provided.    Work note provided.     Billing note: 30 minutes was allotted and spent for patient care and coordination of care (not reported separately) including preparing for the visit, obtaining/reviewing history from/with patient, performing an exam/evaluation, ordering Rx, developing a plan of care, counseling/educating the patient, developing/printing/going over the discharge summary with the patient, producing a work note, and documentation. This template was updated to summarize care specific to this encounter. Established patient. [id number]. Please refer to the chart for additional details on the  care provided.

## 2023-04-10 NOTE — LETTER
April 10, 2023         Patient: Trevon Brennan   YOB: 1970   Date of Visit: 4/10/2023           To Whom it May Concern:    Trevon Brennan was seen in my clinic on 4/10/2023 due to illness. Due to medical necessity, please excuse patient from work through 4/13/2023 as needed.    If you have any questions or concerns, please don't hesitate to call.        Sincerely,           CARINA Amador.  Electronically Signed

## 2023-04-29 ENCOUNTER — APPOINTMENT (OUTPATIENT)
Dept: RADIOLOGY | Facility: MEDICAL CENTER | Age: 53
End: 2023-04-29
Attending: EMERGENCY MEDICINE
Payer: COMMERCIAL

## 2023-04-29 ENCOUNTER — HOSPITAL ENCOUNTER (EMERGENCY)
Facility: MEDICAL CENTER | Age: 53
End: 2023-04-29
Attending: EMERGENCY MEDICINE
Payer: COMMERCIAL

## 2023-04-29 VITALS
WEIGHT: 159.17 LBS | OXYGEN SATURATION: 97 % | HEIGHT: 62 IN | SYSTOLIC BLOOD PRESSURE: 140 MMHG | RESPIRATION RATE: 16 BRPM | BODY MASS INDEX: 29.29 KG/M2 | TEMPERATURE: 97.6 F | DIASTOLIC BLOOD PRESSURE: 80 MMHG | HEART RATE: 75 BPM

## 2023-04-29 DIAGNOSIS — R11.0 NAUSEA: ICD-10-CM

## 2023-04-29 DIAGNOSIS — H57.13 EYE PAIN, BILATERAL: ICD-10-CM

## 2023-04-29 DIAGNOSIS — R42 DIZZINESS: ICD-10-CM

## 2023-04-29 LAB
ALBUMIN SERPL BCP-MCNC: 4.4 G/DL (ref 3.2–4.9)
ALBUMIN/GLOB SERPL: 1.8 G/DL
ALP SERPL-CCNC: 105 U/L (ref 30–99)
ALT SERPL-CCNC: 10 U/L (ref 2–50)
ANION GAP SERPL CALC-SCNC: 13 MMOL/L (ref 7–16)
AST SERPL-CCNC: 14 U/L (ref 12–45)
BASOPHILS # BLD AUTO: 0.5 % (ref 0–1.8)
BASOPHILS # BLD: 0.03 K/UL (ref 0–0.12)
BILIRUB SERPL-MCNC: 0.3 MG/DL (ref 0.1–1.5)
BUN SERPL-MCNC: 9 MG/DL (ref 8–22)
CALCIUM ALBUM COR SERPL-MCNC: 9.2 MG/DL (ref 8.5–10.5)
CALCIUM SERPL-MCNC: 9.5 MG/DL (ref 8.5–10.5)
CHLORIDE SERPL-SCNC: 99 MMOL/L (ref 96–112)
CO2 SERPL-SCNC: 25 MMOL/L (ref 20–33)
CREAT SERPL-MCNC: 0.66 MG/DL (ref 0.5–1.4)
EOSINOPHIL # BLD AUTO: 0.05 K/UL (ref 0–0.51)
EOSINOPHIL NFR BLD: 0.9 % (ref 0–6.9)
ERYTHROCYTE [DISTWIDTH] IN BLOOD BY AUTOMATED COUNT: 40.1 FL (ref 35.9–50)
GFR SERPLBLD CREATININE-BSD FMLA CKD-EPI: 104 ML/MIN/1.73 M 2
GLOBULIN SER CALC-MCNC: 2.5 G/DL (ref 1.9–3.5)
GLUCOSE SERPL-MCNC: 95 MG/DL (ref 65–99)
HCT VFR BLD AUTO: 39.8 % (ref 37–47)
HGB BLD-MCNC: 12.8 G/DL (ref 12–16)
IMM GRANULOCYTES # BLD AUTO: 0.02 K/UL (ref 0–0.11)
IMM GRANULOCYTES NFR BLD AUTO: 0.3 % (ref 0–0.9)
LYMPHOCYTES # BLD AUTO: 1.36 K/UL (ref 1–4.8)
LYMPHOCYTES NFR BLD: 23.6 % (ref 22–41)
MCH RBC QN AUTO: 26.3 PG (ref 27–33)
MCHC RBC AUTO-ENTMCNC: 32.2 G/DL (ref 33.6–35)
MCV RBC AUTO: 81.7 FL (ref 81.4–97.8)
MONOCYTES # BLD AUTO: 0.45 K/UL (ref 0–0.85)
MONOCYTES NFR BLD AUTO: 7.8 % (ref 0–13.4)
NEUTROPHILS # BLD AUTO: 3.85 K/UL (ref 2–7.15)
NEUTROPHILS NFR BLD: 66.9 % (ref 44–72)
NRBC # BLD AUTO: 0 K/UL
NRBC BLD-RTO: 0 /100 WBC
PLATELET # BLD AUTO: 239 K/UL (ref 164–446)
PMV BLD AUTO: 8.8 FL (ref 9–12.9)
POTASSIUM SERPL-SCNC: 3.5 MMOL/L (ref 3.6–5.5)
PROT SERPL-MCNC: 6.9 G/DL (ref 6–8.2)
RBC # BLD AUTO: 4.87 M/UL (ref 4.2–5.4)
SODIUM SERPL-SCNC: 137 MMOL/L (ref 135–145)
WBC # BLD AUTO: 5.8 K/UL (ref 4.8–10.8)

## 2023-04-29 PROCEDURE — 700101 HCHG RX REV CODE 250: Mod: UD | Performed by: EMERGENCY MEDICINE

## 2023-04-29 PROCEDURE — 85025 COMPLETE CBC W/AUTO DIFF WBC: CPT

## 2023-04-29 PROCEDURE — 700105 HCHG RX REV CODE 258: Mod: UD | Performed by: EMERGENCY MEDICINE

## 2023-04-29 PROCEDURE — 96374 THER/PROPH/DIAG INJ IV PUSH: CPT

## 2023-04-29 PROCEDURE — 70450 CT HEAD/BRAIN W/O DYE: CPT

## 2023-04-29 PROCEDURE — 80053 COMPREHEN METABOLIC PANEL: CPT

## 2023-04-29 PROCEDURE — 99284 EMERGENCY DEPT VISIT MOD MDM: CPT

## 2023-04-29 PROCEDURE — 700111 HCHG RX REV CODE 636 W/ 250 OVERRIDE (IP): Mod: UD | Performed by: EMERGENCY MEDICINE

## 2023-04-29 PROCEDURE — 36415 COLL VENOUS BLD VENIPUNCTURE: CPT

## 2023-04-29 RX ORDER — ONDANSETRON 2 MG/ML
4 INJECTION INTRAMUSCULAR; INTRAVENOUS ONCE
Status: COMPLETED | OUTPATIENT
Start: 2023-04-29 | End: 2023-04-29

## 2023-04-29 RX ORDER — PROPARACAINE HYDROCHLORIDE 5 MG/ML
2 SOLUTION/ DROPS OPHTHALMIC ONCE
Status: COMPLETED | OUTPATIENT
Start: 2023-04-29 | End: 2023-04-29

## 2023-04-29 RX ORDER — ONDANSETRON 4 MG/1
4 TABLET, ORALLY DISINTEGRATING ORAL EVERY 6 HOURS PRN
Qty: 10 TABLET | Refills: 0 | Status: SHIPPED | OUTPATIENT
Start: 2023-04-29 | End: 2023-09-06

## 2023-04-29 RX ORDER — SODIUM CHLORIDE 9 MG/ML
1000 INJECTION, SOLUTION INTRAVENOUS ONCE
Status: COMPLETED | OUTPATIENT
Start: 2023-04-29 | End: 2023-04-29

## 2023-04-29 RX ADMIN — PROPARACAINE HYDROCHLORIDE 2 DROP: 5 SOLUTION/ DROPS OPHTHALMIC at 14:27

## 2023-04-29 RX ADMIN — ONDANSETRON 4 MG: 2 INJECTION INTRAMUSCULAR; INTRAVENOUS at 14:58

## 2023-04-29 RX ADMIN — SODIUM CHLORIDE 1000 ML: 9 INJECTION, SOLUTION INTRAVENOUS at 14:57

## 2023-04-29 RX ADMIN — FLUORESCEIN SODIUM 2 MG: 1 STRIP OPHTHALMIC at 14:27

## 2023-04-29 ASSESSMENT — FIBROSIS 4 INDEX: FIB4 SCORE: 1.1

## 2023-04-29 NOTE — ED TRIAGE NOTES
"Chief Complaint   Patient presents with    Eye Pain     Both eyes, pt states it feels like \"there are eye lashes stuck in her eyes\" \"a stabbing feeling\" for the last 24 hours. Tried taking ibuprofen and benadryl but it hasn't helped.     Dizziness     For the last 24 hours. C/o feeling nausea        /81   Pulse 87   Temp 36.6 °C (97.8 °F) (Temporal)   Resp 18   Ht 1.575 m (5' 2\")   Wt 72.2 kg (159 lb 2.8 oz)   LMP 03/01/2009   SpO2 97%   BMI 29.11 kg/m²     "

## 2023-04-30 NOTE — DISCHARGE INSTRUCTIONS
Continue your allergy medication.  Call your primary care doctor first thing Monday morning and arrange office recheck during the week and also schedule consultation with the ophthalmologist regarding your eye pain.  Return here if you are developing new or worsening symptoms.

## 2023-04-30 NOTE — ED PROVIDER NOTES
"ED Provider Note    CHIEF COMPLAINT  Chief Complaint   Patient presents with    Eye Pain     Both eyes, pt states it feels like \"there are eye lashes stuck in her eyes\" \"a stabbing feeling\" for the last 24 hours. Tried taking ibuprofen and benadryl but it hasn't helped.     Dizziness     For the last 24 hours. C/o feeling nausea        EXTERNAL RECORDS REVIEWED  Other please see chart review below    HPI/ROS    Trevon Brennan is a 52 y.o. female who presents to the emergency department complaining that she has been feeling ill for about 24 hours now.  The patient says that she felt lightheaded and may be a little bit \"motion sick\" she has had some nausea she has discomfort in both eyes that feels like something might be in her eyes but does not recall any foreign body exposures.  She has had a mild global headache.  She has felt that her vision might be a little more blurry than usual more on the left than on the right.  She does wear glasses but does not wear contact lenses.  The patient says that she has been sick several times recently and was treated with Augmentin and steroids for a sinus infection about a week ago.  She did try taking ibuprofen and Benadryl and also loratadine for what she thought were allergy symptoms.  She does not recognize any specific exacerbating or alleviating factors tenderness or precipitating events    Review of systems: No fever, she has had nausea but no vomiting, she is not having a headache or neck pain.  No chest pain cough shortness of breath or hemoptysis no numbness tingling or weakness in the extremities.  All other systems negative    Chart review: I reviewed an urgent care note from April 10, 2023 indicating the patient presented with 5 days of sinus problems left ear pain sinus pressure and was evaluated and diagnosed with acute pansinusitis and given prescriptions for Augmentin, Medrol Dosepak and Diflucan.    PAST MEDICAL HISTORY   has a past medical history of " Bowel habit changes, Depression (2016), Heart burn, Indigestion, migraines, and Urinary incontinence.    SURGICAL HISTORY   has a past surgical history that includes other abdominal surgery; other abdominal surgery; gastroscopy-endo (12/21/2011); ercp in or (11/18/2012); vaginal hysterectomy scope total (1/3/2017); anterior and posterior repair (1/3/2017); enterocele repair (1/3/2017); bladder sling female (1/3/2017); and vaginal suspension (1/3/2017).    FAMILY HISTORY  History reviewed. No pertinent family history.    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 (Registered Nurse) on 04/29/23 at 1321  Med List Status: Not Addressed     Medication Last Dose Status   calcium carbonate (TUMS E-X 750) 750 MG chewable tablet  Active   Cariprazine HCl (VRAYLAR) 4.5 MG Cap  Active   fluticasone (FLONASE ALLERGY RELIEF) 50 MCG/ACT nasal spray  Active   folic acid (FOLVITE) 1 MG Tab  Active   gabapentin (NEURONTIN) 300 MG Cap  Active   guaiFENesin (MUCINEX PO)  Active   methylPREDNISolone (MEDROL DOSEPAK) 4 MG Tablet Therapy Pack  Active   multivitamin (THERAGRAN) Tab  Active   naltrexone (DEPADE) 50 MG Tab  Active   ondansetron (ZOFRAN) 8 MG Tab  Active   polyethylene glycol 3350 (MIRALAX) 17 GM/SCOOP Powder  Active   potassium chloride SA (KDUR) 20 MEQ Tab CR  Active   prazosin (MINIPRESS) 1 MG Cap  Active   QUEtiapine (SEROQUEL) 100 MG Tab  Active   traZODone (DESYREL) 100 MG Tab  Active                    ALLERGIES  Allergies   Allergen Reactions    Erythromycin Hives     Hives for a week       PHYSICAL EXAM  VITAL SIGNS: BP (!) 140/80   Pulse 75   Temp 36.4 °C  "(97.6 °F) (Temporal)   Resp 16   Ht 1.575 m (5' 2\")   Wt 72.2 kg (159 lb 2.8 oz)   LMP 03/01/2009   SpO2 97%   BMI 29.11 kg/m²    Constitutional: Awake lucid verbal she does not appear toxic or distressed  HENT: Mucous membranes are dry throat clear.  Eyes: No erythema discharge or jaundice.  Pupils are briskly reactive to light.  Visual acuity testing shows 20/20 vision in both eyes.  The patient got very good relief of her eye symptoms with topical proparacaine eyedrops.  Fluorescein staining and slit-lamp examination showed no evidence of abnormal fluorescein uptake, foreign bodies or corneal abrasion.  The anterior chambers look clear.  Neck: No meningeal findings  Cardiovascular: Regular rate and rhythm  Respiratory: Clear bilaterally with no apparent difficulty breathing  Skin: Warm and dry  Musculoskeletal: No acute bony deformity  Neurologic: Awake verbal moving all extremities without difficulty, she is ambulatory without assistance in the emergency department        DIAGNOSTIC STUDIES / PROCEDURES    RADIOLOGY  Radiologist interpretation:   CT-HEAD W/O   Final Result      No acute intracranial abnormality.               COURSE & MEDICAL DECISION MAKING  In the emergency department an IV was established the patient was given intravenous fluids because of concerns regarding dehydration and Zofran due to her complaint of nausea.  As noted above the proparacaine eyedrops were quite helpful so I think this process is something on the surface of the eye rather than process deeper in the globe.  The patient is feeling a bit better after fluids and Zofran and she is up and ambulatory.  I think it is going to be safe for her to go home but I have advised her to call her primary care doctor at [facility] first thing Monday morning and arrange office recheck during the week.  I have recommended that she continue her allergy medications as allergy symptoms would remain within the differential diagnosis at this " time.  Because of her eye symptoms I have recommended that she call Dr. Harris's office Monday morning and arrange consultation with the ophthalmologist.  If she feels she is developing new or worsening symptoms she is to return here for recheck    FINAL DIAGNOSIS  1. Dizziness    2. Nausea    3. Eye pain, bilateral           Electronically signed by: Leander Ochoa M.D., 4/29/2023 6:33 PM

## 2023-04-30 NOTE — ED NOTES
Patient discharged home per ERP.  Discharge teaching and education discussed with patient. POC discussed.   Patient verbalized understanding of discharge teaching and education. No other questions at this time.     RX for zofran sent to pt's pharmacy, & phone number provided for opthalmology follow-up.  PIV removed.     VSS. Patient alert and oriented. Patient arranged ride for self. Able to ambulate off unit safely with steady gait.

## 2023-05-01 ENCOUNTER — OFFICE VISIT (OUTPATIENT)
Dept: URGENT CARE | Facility: CLINIC | Age: 53
End: 2023-05-01
Payer: COMMERCIAL

## 2023-05-01 VITALS
SYSTOLIC BLOOD PRESSURE: 118 MMHG | TEMPERATURE: 97.5 F | WEIGHT: 160 LBS | HEART RATE: 96 BPM | BODY MASS INDEX: 29.44 KG/M2 | DIASTOLIC BLOOD PRESSURE: 68 MMHG | RESPIRATION RATE: 16 BRPM | OXYGEN SATURATION: 98 % | HEIGHT: 62 IN

## 2023-05-01 DIAGNOSIS — H10.9 CONJUNCTIVITIS OF BOTH EYES, UNSPECIFIED CONJUNCTIVITIS TYPE: ICD-10-CM

## 2023-05-01 DIAGNOSIS — H04.129 EYE DRYNESS: ICD-10-CM

## 2023-05-01 PROCEDURE — 99213 OFFICE O/P EST LOW 20 MIN: CPT | Performed by: STUDENT IN AN ORGANIZED HEALTH CARE EDUCATION/TRAINING PROGRAM

## 2023-05-01 RX ORDER — HYDROXYZINE 50 MG/1
TABLET, FILM COATED ORAL
COMMUNITY
Start: 2023-04-25

## 2023-05-01 RX ORDER — ERGOCALCIFEROL 1.25 MG/1
CAPSULE ORAL
COMMUNITY
Start: 2023-03-28

## 2023-05-01 RX ORDER — SIMVASTATIN 10 MG
TABLET ORAL
COMMUNITY
Start: 2023-04-17

## 2023-05-01 RX ORDER — RISPERIDONE 120 MG
KIT SUBCUTANEOUS
COMMUNITY
Start: 2023-02-06

## 2023-05-01 RX ORDER — POLYMYXIN B SULFATE AND TRIMETHOPRIM 1; 10000 MG/ML; [USP'U]/ML
1 SOLUTION OPHTHALMIC EVERY 4 HOURS
Qty: 10 ML | Refills: 0 | Status: SHIPPED | OUTPATIENT
Start: 2023-05-01

## 2023-05-01 RX ORDER — LORATADINE/PSEUDOEPHEDRINE 10MG-240MG
TABLET, EXTENDED RELEASE 24 HR ORAL
COMMUNITY
Start: 2023-04-10

## 2023-05-01 RX ORDER — BUSPIRONE HYDROCHLORIDE 10 MG/1
TABLET ORAL
COMMUNITY
Start: 2023-03-16

## 2023-05-01 RX ORDER — DOCUSATE SODIUM 100 MG/1
CAPSULE, LIQUID FILLED ORAL
COMMUNITY
Start: 2023-04-17

## 2023-05-01 RX ORDER — OXCARBAZEPINE 150 MG/1
TABLET, FILM COATED ORAL
COMMUNITY
Start: 2023-02-02

## 2023-05-01 ASSESSMENT — ENCOUNTER SYMPTOMS
CHILLS: 0
DOUBLE VISION: 0
ABDOMINAL PAIN: 0
NAUSEA: 0
PALPITATIONS: 0
EYE REDNESS: 1
DIARRHEA: 0
FATIGUE: 0
COUGH: 0
SORE THROAT: 0
WHEEZING: 0
BLURRED VISION: 0
EYE PAIN: 0
DIZZINESS: 0
VISUAL CHANGE: 0
CONSTIPATION: 0
SHORTNESS OF BREATH: 0
VOMITING: 0
FEVER: 0
PHOTOPHOBIA: 0
HEADACHES: 0

## 2023-05-01 ASSESSMENT — FIBROSIS 4 INDEX: FIB4 SCORE: 1

## 2023-05-01 NOTE — PROGRESS NOTES
"Subjective     Trevon Brennan is a 52 y.o. female who presents with Dry Eye (B/L )            Trevon is 52 y.o. female who presents to urgent care for bilateral eye redness, discharge and sensation of dryness. Patient states she was recently seen in ER for dizziness and bilateral eye pain. Patient states labs/tests were done in ER and patient was discharged home and advised to follow up with PCP.    Dry Eye  This is a new problem. The current episode started in the past 7 days. The problem has been unchanged. Pertinent negatives include no abdominal pain, chest pain, chills, congestion, coughing, fatigue, fever, headaches, nausea, rash, sore throat, visual change, vomiting or weakness.     Review of Systems   Constitutional:  Negative for chills, fatigue, fever and malaise/fatigue.   HENT:  Negative for congestion, ear pain and sore throat.    Eyes:  Positive for discharge and redness. Negative for blurred vision, double vision, photophobia and pain.   Respiratory:  Negative for cough, shortness of breath and wheezing.    Cardiovascular:  Negative for chest pain and palpitations.   Gastrointestinal:  Negative for abdominal pain, constipation, diarrhea, nausea and vomiting.   Skin:  Negative for rash.   Neurological:  Negative for dizziness, speech change, focal weakness, weakness and headaches.   All other systems reviewed and are negative.           Objective     /68 (BP Location: Left arm, Patient Position: Sitting, BP Cuff Size: Adult)   Pulse 96   Temp 36.4 °C (97.5 °F) (Temporal)   Resp 16   Ht 1.575 m (5' 2\")   Wt 72.6 kg (160 lb)   LMP 03/01/2009   SpO2 98%   BMI 29.26 kg/m²      Physical Exam  Vitals reviewed.   Constitutional:       General: She is not in acute distress.     Appearance: Normal appearance. She is not ill-appearing, toxic-appearing or diaphoretic.   HENT:      Head: Normocephalic and atraumatic.      Nose: Nose normal.      Mouth/Throat:      Mouth: Mucous membranes are " moist.      Pharynx: Oropharynx is clear.   Eyes:      Extraocular Movements: Extraocular movements intact.      Conjunctiva/sclera: Conjunctivae normal.      Pupils: Pupils are equal, round, and reactive to light.   Cardiovascular:      Rate and Rhythm: Normal rate and regular rhythm.   Pulmonary:      Effort: Pulmonary effort is normal.      Breath sounds: Normal breath sounds.   Musculoskeletal:         General: Normal range of motion.      Cervical back: Normal range of motion.   Neurological:      General: No focal deficit present.      Mental Status: She is alert. Mental status is at baseline.      GCS: GCS eye subscore is 4. GCS verbal subscore is 5. GCS motor subscore is 6.      Cranial Nerves: Cranial nerves 2-12 are intact.      Sensory: Sensation is intact.      Motor: Motor function is intact.      Coordination: Coordination is intact.      Gait: Gait is intact.                           Assessment & Plan        1. Conjunctivitis of both eyes, unspecified conjunctivitis type  - polymixin-trimethoprim (POLYTRIM) 97138-6.1 UNIT/ML-% Solution; Administer 1 Drop into both eyes every 4 hours.  Dispense: 10 mL; Refill: 0    2. Eye dryness  - Recommended OTC eye drops for dry eye.    I personally reviewed prior external notes and test results pertinent to today's visit. Differential diagnoses, supportive care measures, and indications for immediate follow-up discussed with patient. Pathogenesis of diagnosis discussed including typical length and natural progression.  Advised to follow up with PCP.    Instructed to return to urgent care or nearest emergency department if symptoms fail to improve, for any change in condition, further concerns, or new concerning symptoms.    Patient states understanding and agrees with the plan of care and discharge instructions.

## 2023-05-04 ASSESSMENT — ENCOUNTER SYMPTOMS
EYE DISCHARGE: 1
WEAKNESS: 0
FOCAL WEAKNESS: 0
SPEECH CHANGE: 0

## 2023-09-06 ENCOUNTER — HOSPITAL ENCOUNTER (EMERGENCY)
Facility: MEDICAL CENTER | Age: 53
End: 2023-09-06
Attending: STUDENT IN AN ORGANIZED HEALTH CARE EDUCATION/TRAINING PROGRAM
Payer: COMMERCIAL

## 2023-09-06 VITALS
HEART RATE: 60 BPM | SYSTOLIC BLOOD PRESSURE: 168 MMHG | RESPIRATION RATE: 20 BRPM | TEMPERATURE: 97.7 F | DIASTOLIC BLOOD PRESSURE: 88 MMHG | WEIGHT: 150 LBS | OXYGEN SATURATION: 94 % | BODY MASS INDEX: 27.6 KG/M2 | HEIGHT: 62 IN

## 2023-09-06 DIAGNOSIS — A09 DIARRHEA OF INFECTIOUS ORIGIN: ICD-10-CM

## 2023-09-06 DIAGNOSIS — E86.0 MILD DEHYDRATION: ICD-10-CM

## 2023-09-06 LAB
ALBUMIN SERPL BCP-MCNC: 4.6 G/DL (ref 3.2–4.9)
ALBUMIN/GLOB SERPL: 2.1 G/DL
ALP SERPL-CCNC: 103 U/L (ref 30–99)
ALT SERPL-CCNC: 22 U/L (ref 2–50)
ANION GAP SERPL CALC-SCNC: 11 MMOL/L (ref 7–16)
AST SERPL-CCNC: 27 U/L (ref 12–45)
BASOPHILS # BLD AUTO: 0.4 % (ref 0–1.8)
BASOPHILS # BLD: 0.02 K/UL (ref 0–0.12)
BILIRUB SERPL-MCNC: 0.3 MG/DL (ref 0.1–1.5)
BUN SERPL-MCNC: 8 MG/DL (ref 8–22)
CALCIUM ALBUM COR SERPL-MCNC: 9.4 MG/DL (ref 8.5–10.5)
CALCIUM SERPL-MCNC: 9.9 MG/DL (ref 8.5–10.5)
CHLORIDE SERPL-SCNC: 95 MMOL/L (ref 96–112)
CO2 SERPL-SCNC: 27 MMOL/L (ref 20–33)
CREAT SERPL-MCNC: 0.79 MG/DL (ref 0.5–1.4)
EOSINOPHIL # BLD AUTO: 0.08 K/UL (ref 0–0.51)
EOSINOPHIL NFR BLD: 1.7 % (ref 0–6.9)
ERYTHROCYTE [DISTWIDTH] IN BLOOD BY AUTOMATED COUNT: 42.4 FL (ref 35.9–50)
GFR SERPLBLD CREATININE-BSD FMLA CKD-EPI: 88 ML/MIN/1.73 M 2
GLOBULIN SER CALC-MCNC: 2.2 G/DL (ref 1.9–3.5)
GLUCOSE SERPL-MCNC: 91 MG/DL (ref 65–99)
HCG SERPL QL: NEGATIVE
HCT VFR BLD AUTO: 37.5 % (ref 37–47)
HGB BLD-MCNC: 12.6 G/DL (ref 12–16)
IMM GRANULOCYTES # BLD AUTO: 0.01 K/UL (ref 0–0.11)
IMM GRANULOCYTES NFR BLD AUTO: 0.2 % (ref 0–0.9)
LIPASE SERPL-CCNC: 29 U/L (ref 11–82)
LYMPHOCYTES # BLD AUTO: 1.37 K/UL (ref 1–4.8)
LYMPHOCYTES NFR BLD: 29.9 % (ref 22–41)
MCH RBC QN AUTO: 27.2 PG (ref 27–33)
MCHC RBC AUTO-ENTMCNC: 33.6 G/DL (ref 32.2–35.5)
MCV RBC AUTO: 81 FL (ref 81.4–97.8)
MONOCYTES # BLD AUTO: 0.41 K/UL (ref 0–0.85)
MONOCYTES NFR BLD AUTO: 9 % (ref 0–13.4)
NEUTROPHILS # BLD AUTO: 2.69 K/UL (ref 1.82–7.42)
NEUTROPHILS NFR BLD: 58.8 % (ref 44–72)
NRBC # BLD AUTO: 0 K/UL
NRBC BLD-RTO: 0 /100 WBC (ref 0–0.2)
PLATELET # BLD AUTO: 206 K/UL (ref 164–446)
PMV BLD AUTO: 8.9 FL (ref 9–12.9)
POTASSIUM SERPL-SCNC: 4.3 MMOL/L (ref 3.6–5.5)
PROT SERPL-MCNC: 6.8 G/DL (ref 6–8.2)
RBC # BLD AUTO: 4.63 M/UL (ref 4.2–5.4)
SODIUM SERPL-SCNC: 133 MMOL/L (ref 135–145)
WBC # BLD AUTO: 4.6 K/UL (ref 4.8–10.8)

## 2023-09-06 PROCEDURE — 85025 COMPLETE CBC W/AUTO DIFF WBC: CPT

## 2023-09-06 PROCEDURE — 83690 ASSAY OF LIPASE: CPT

## 2023-09-06 PROCEDURE — 84703 CHORIONIC GONADOTROPIN ASSAY: CPT

## 2023-09-06 PROCEDURE — 99283 EMERGENCY DEPT VISIT LOW MDM: CPT

## 2023-09-06 PROCEDURE — 36415 COLL VENOUS BLD VENIPUNCTURE: CPT

## 2023-09-06 PROCEDURE — 80053 COMPREHEN METABOLIC PANEL: CPT

## 2023-09-06 RX ORDER — ONDANSETRON 4 MG/1
4 TABLET, ORALLY DISINTEGRATING ORAL EVERY 6 HOURS PRN
Qty: 10 TABLET | Refills: 0 | Status: SHIPPED | OUTPATIENT
Start: 2023-09-06

## 2023-09-06 ASSESSMENT — FIBROSIS 4 INDEX: FIB4 SCORE: 1.02

## 2023-09-06 NOTE — ED TRIAGE NOTES
"Chief Complaint   Patient presents with    Diarrhea     PATIENT STATES DIARRHEA SINCE LAST WEEK AND HAS POPPED IN HER SLEEP. PT STATES SHE HAS HAD X5 BOWEL MOVEMENTS WHILE SLEEPING, STOOL IS LOOSE. PCP COULD NOT SEE PT TILL NEXT WEEK.     Nausea     PT WITH INTERMITTENT NAUSEA.            PT AMBULATORY TO TRIAGE. Pt AA&O X 4, SEE ABOVE. PT WITH INTERMITTENT ABD PAIN.    PT TO LOBBY . PT EDUCATED ON ALERTING STAFF TO CHANGES IN CONDITION. PT VERBALIZED AN UNDERSTANDING.     /83   Pulse 71   Temp 36.5 °C (97.7 °F) (Temporal)   Resp 18   Ht 1.575 m (5' 2\")   Wt 68 kg (150 lb)   LMP 03/01/2009   SpO2 95%   BMI 27.44 kg/m²     "

## 2023-09-07 NOTE — DISCHARGE INSTRUCTIONS
You were seen in the emergency department for diarrhea.  Your symptoms are likely caused by a virus.  Symptoms typically last from 7 to 14 days.  Most important thing with viral diarrhea is to keep up with your hydration.  Drink plenty of fluids with electrolyte replenishment, drink such as Gatorade, Pedialyte, chicken broth to keep yourself well-hydrated.  We prescribed a medication called Zofran to help with your nausea.  If you develop bloody stools, fevers, you are unable to keep up with your hydration, or you are feeling increasingly weak and having worsening abdominal pain, come back to the emergency department for reevaluation.

## 2023-09-07 NOTE — ED NOTES
Patient discharged in stable condition per orders. Wristband removed per protocol. Patient verbalized understanding of all discharge instructions. All belongings accounted for. Ambulatory for discharge with steady gait.

## 2023-09-07 NOTE — ED NOTES
Patient tolerated PO Challenge without nausea, vomiting, or increased abdominal pain. States that she feels ready to go home and that her daughter can pick her up.

## 2023-09-07 NOTE — ED NOTES
Ambulatory to BR with steady gait under supervision by ED RN. Specimen container provided and instructed on clean catch urine sample - verbalized understanding. Patient states that she is unable to urinate at this time.

## 2023-11-13 ENCOUNTER — HOSPITAL ENCOUNTER (EMERGENCY)
Facility: MEDICAL CENTER | Age: 53
End: 2023-11-13
Attending: EMERGENCY MEDICINE
Payer: COMMERCIAL

## 2023-11-13 VITALS
OXYGEN SATURATION: 97 % | DIASTOLIC BLOOD PRESSURE: 84 MMHG | RESPIRATION RATE: 16 BRPM | TEMPERATURE: 97 F | BODY MASS INDEX: 25.89 KG/M2 | WEIGHT: 141.54 LBS | SYSTOLIC BLOOD PRESSURE: 164 MMHG | HEART RATE: 69 BPM

## 2023-11-13 DIAGNOSIS — G43.909 MIGRAINE WITHOUT STATUS MIGRAINOSUS, NOT INTRACTABLE, UNSPECIFIED MIGRAINE TYPE: ICD-10-CM

## 2023-11-13 PROCEDURE — 96374 THER/PROPH/DIAG INJ IV PUSH: CPT

## 2023-11-13 PROCEDURE — 96375 TX/PRO/DX INJ NEW DRUG ADDON: CPT

## 2023-11-13 PROCEDURE — 700105 HCHG RX REV CODE 258: Mod: UD | Performed by: EMERGENCY MEDICINE

## 2023-11-13 PROCEDURE — 700111 HCHG RX REV CODE 636 W/ 250 OVERRIDE (IP): Mod: UD | Performed by: EMERGENCY MEDICINE

## 2023-11-13 PROCEDURE — 99284 EMERGENCY DEPT VISIT MOD MDM: CPT

## 2023-11-13 RX ORDER — LORAZEPAM 2 MG/ML
0.5 INJECTION INTRAMUSCULAR ONCE
Status: DISCONTINUED | OUTPATIENT
Start: 2023-11-13 | End: 2023-11-13 | Stop reason: HOSPADM

## 2023-11-13 RX ORDER — PROCHLORPERAZINE EDISYLATE 5 MG/ML
10 INJECTION INTRAMUSCULAR; INTRAVENOUS ONCE
Status: COMPLETED | OUTPATIENT
Start: 2023-11-13 | End: 2023-11-13

## 2023-11-13 RX ORDER — SODIUM CHLORIDE 9 MG/ML
1000 INJECTION, SOLUTION INTRAVENOUS ONCE
Status: COMPLETED | OUTPATIENT
Start: 2023-11-13 | End: 2023-11-13

## 2023-11-13 RX ORDER — DIPHENHYDRAMINE HYDROCHLORIDE 50 MG/ML
25 INJECTION INTRAMUSCULAR; INTRAVENOUS ONCE
Status: COMPLETED | OUTPATIENT
Start: 2023-11-13 | End: 2023-11-13

## 2023-11-13 RX ORDER — KETOROLAC TROMETHAMINE 30 MG/ML
30 INJECTION, SOLUTION INTRAMUSCULAR; INTRAVENOUS ONCE
Status: COMPLETED | OUTPATIENT
Start: 2023-11-13 | End: 2023-11-13

## 2023-11-13 RX ADMIN — KETOROLAC TROMETHAMINE 30 MG: 30 INJECTION, SOLUTION INTRAMUSCULAR at 08:25

## 2023-11-13 RX ADMIN — PROCHLORPERAZINE EDISYLATE 10 MG: 5 INJECTION INTRAMUSCULAR; INTRAVENOUS at 08:25

## 2023-11-13 RX ADMIN — DIPHENHYDRAMINE HYDROCHLORIDE 25 MG: 50 INJECTION, SOLUTION INTRAMUSCULAR; INTRAVENOUS at 08:25

## 2023-11-13 RX ADMIN — SODIUM CHLORIDE 1000 ML: 9 INJECTION, SOLUTION INTRAVENOUS at 08:25

## 2023-11-13 ASSESSMENT — PAIN DESCRIPTION - PAIN TYPE
TYPE: ACUTE PAIN
TYPE: ACUTE PAIN

## 2023-11-13 ASSESSMENT — FIBROSIS 4 INDEX: FIB4 SCORE: 1.54

## 2023-11-13 NOTE — ED NOTES
Ambulated with steady gait to room, changed into gown, pt with hx of migraines, currently out of meds Rx for migraines     Visual acuity:    Baseline glasses    Both eyes 20/25  R 20/26  L 20/25

## 2023-11-13 NOTE — ED NOTES
Ambulated to bathroom, pt states not dizzy/lightheaded, no balance issues observed, successful and appropriate walking mobility. IV placed, IV meds given, IVF started

## 2023-11-13 NOTE — ED TRIAGE NOTES
Chief Complaint   Patient presents with    Headache     Patient with headache x 2 days. Worsening today. 7/10 pain behind left eye. Endorses nausea. No vision changes. Patient reports history or migraines. Patient reports taking only Ibuprofen         Patient educated on triage process. Informed to notified ED staff of change in symptoms.     Vitals:    11/13/23 0647   BP: (!) 146/86   Pulse: 88   Resp: 14   Temp: 36.1 °C (96.9 °F)   SpO2: 97%

## 2023-11-13 NOTE — DISCHARGE INSTRUCTIONS
See your primary care doctor for follow-up regarding her migraine    Return to the ER for fever, recurrent or increasing pain, vomiting, or other concerns    You have received medications that are sedating here in the ER today.  Please do not drive, operate heavy machinery, or drink alcohol for the next 24 hours.

## 2023-11-13 NOTE — ED PROVIDER NOTES
"ED Provider Note    CHIEF COMPLAINT  Chief Complaint   Patient presents with    Headache     Patient with headache x 2 days. Worsening today. 7/10 pain behind left eye. Endorses nausea. No vision changes. Patient reports history or migraines. Patient reports taking only Ibuprofen        EXTERNAL RECORDS REVIEWED  Other none    HPI/ROS  LIMITATION TO HISTORY   Select: : None    OUTSIDE HISTORIAN(S):  none    Trevon Brennan is a 53 y.o. female with history of migraines who presents for migraine headache.    Patient states that she has had a migraine headache that is typical of her migraines for the last 2 days.  It is located behind the left eye.  She gets this type of headache every 6 months or so.  She has had them \"for my whole life.\"  She typically takes ibuprofen but that did not relieve this headache.  She has had associated nausea and photophobia.      She denies visual changes, fever, chills, trauma, neck pain, weakness, numbness.    PAST MEDICAL HISTORY   has a past medical history of Bowel habit changes, Depression (2016), Heart burn, Indigestion, migraines, and Urinary incontinence.    SURGICAL HISTORY   has a past surgical history that includes other abdominal surgery; other abdominal surgery; gastroscopy-endo (12/21/2011); ercp in or (11/18/2012); vaginal hysterectomy scope total (1/3/2017); anterior and posterior repair (1/3/2017); enterocele repair (1/3/2017); bladder sling female (1/3/2017); and vaginal suspension (1/3/2017).    FAMILY HISTORY  History reviewed. No pertinent family history.    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 (Registered Nurse) on 11/13/23 at 0653  Med List Status: Partial     Medication Last Dose Status   busPIRone (BUSPAR) 10 MG Tab tablet  Active   calcium carbonate (TUMS E-X 750) 750 MG chewable tablet  Active   Cariprazine HCl (VRAYLAR) 4.5 MG Cap  Active   docusate sodium (COLACE) 100 MG Cap  Active   fluticasone (FLONASE ALLERGY RELIEF) 50 MCG/ACT nasal spray  Active   folic acid (FOLVITE) 1 MG Tab  Active   gabapentin (NEURONTIN) 300 MG Cap  Active   guaiFENesin (MUCINEX PO)  Active   hydrOXYzine HCl (ATARAX) 50 MG Tab  Active   LORATADINE-D 24HR  MG per tablet  Active   methylPREDNISolone (MEDROL DOSEPAK) 4 MG Tablet Therapy Pack  Active   multivitamin (THERAGRAN) Tab  Active   naltrexone (DEPADE) 50 MG Tab  Active   ondansetron (ZOFRAN ODT) 4 MG TABLET DISPERSIBLE  Active   ondansetron (ZOFRAN) 8 MG Tab  Active   OXcarbazepine (TRILEPTAL) 150 MG Tab  Active   PERSERIS 120 MG Prefilled Syringe  Active   polyethylene glycol 3350 (MIRALAX) 17 GM/SCOOP Powder  Active   polymixin-trimethoprim (POLYTRIM) 97744-3.1 UNIT/ML-% Solution  Active   potassium chloride SA (KDUR) 20 MEQ Tab CR  Active   prazosin (MINIPRESS) 1 MG Cap  Active   QUEtiapine (SEROQUEL) 100 MG Tab  Active   simvastatin (ZOCOR) 10 MG Tab  Active   traZODone (DESYREL) 100 MG Tab  Active   vitamin D2, Ergocalciferol, (DRISDOL) 1.25 MG (22994 UT) Cap capsule  Active                    ALLERGIES  Allergies   Allergen Reactions    Erythromycin Hives     Hives for a week       PHYSICAL EXAM  VITAL SIGNS: BP (!) 164/84   Pulse 69   Temp 36.1 °C (97 °F) (Temporal)   Resp 16   Wt 64.2 kg (141 lb 8.6 oz)   LMP 03/01/2009   SpO2 97%   BMI 25.89 kg/m²    General:  WDWN female, nontoxic appearing in NAD; A+Ox3; V/S as above; elevated blood pressure, afebrile  Skin: warm and dry; good color; no rash  HEENT: NCAT; EOMs intact; PERRL; no scleral icterus   Neck: FROM, no stridor, no meningismus  Cardiovascular: Regular heart rate  and rhythm.  No murmurs, rubs, or gallops; pulses 2+ bilaterally radially  Lungs: No respiratory distress or tachypnea; Clear to auscultation with good air movement bilaterally.  No wheezes, rhonchi, or rales.   Abdomen: BS present; soft; NTND; no rebound, guarding, or rigidity.  No organomegaly or pulsatile mass  Extremities: ARCHER x 4; no e/o trauma; no pedal edema; neg Ayesha's  Neurologic: CNs III-XII grossly intact; speech clear; distal sensation intact; strength 5/5 UE/LEs  Psychiatric: Appropriate affect, normal mood      DIAGNOSTIC STUDIES / PROCEDURES  None    COURSE & MEDICAL DECISION MAKING    ED Observation Status? Yes; I am placing the patient in to an observation status due to a diagnostic uncertainty as well as therapeutic intensity. Patient placed in observation status at 8:06 AM, 11/13/2023.     Observation plan is as follows: Migraine management, rehydration    Upon Reevaluation, the patient's condition has: Improved; and will be discharged.    Patient discharged from ED Observation status at 10:20 AM (Time) 11/13/23 (Date).     INITIAL ASSESSMENT, COURSE AND PLAN  Care Narrative: This is a 53-year-old female who has had longstanding history of migraines and has a migraine that is typical of her usual pain but not relieved this time by ibuprofen.  She has no focal neurologic deficits.  No concern for meningitis.  No concern for aneurysmal bleed.  No history of trauma.    Labs and imaging are not indicated.    Patient amenable to receiving headache medication regimen including Toradol, Benadryl, and antinausea medication that she states has worked before.    On reevaluation, the patient states her pain is improved, now 4 out of 10.  Patient minimal to being discharged at this level of pain.  She will return for worsening symptoms or other concerns.  She was advised to follow-up with her PCP who she states manages her migraines typically.        FINAL DIAGNOSIS  1. Migraine without status migrainosus,  not intractable, unspecified migraine type      Electronically signed by: Evy Baldwin M.D., 11/13/2023 7:50 AM

## 2023-11-13 NOTE — ED NOTES
Pt discharged, all appropriate hospital equipment removed (IV, monitor, pulse ox, etc.). Pt left unit via walking for home. Personal belongings with pt when leaving unit. Pt given discharge instructions prior to leaving ER, including where to  prescriptions and when to follow-up if applicable; verbalizes understanding. Pt informed to return to ED if symptoms worsen/return or altered status develop. Copy of discharge instructions signed and turned into DC basket and copy sent with pt. F/u with pcp